# Patient Record
Sex: MALE | Race: BLACK OR AFRICAN AMERICAN | Employment: FULL TIME | URBAN - NONMETROPOLITAN AREA
[De-identification: names, ages, dates, MRNs, and addresses within clinical notes are randomized per-mention and may not be internally consistent; named-entity substitution may affect disease eponyms.]

---

## 2022-05-18 ENCOUNTER — OFFICE VISIT (OUTPATIENT)
Dept: SURGERY | Age: 62
End: 2022-05-18
Payer: COMMERCIAL

## 2022-05-18 VITALS
BODY MASS INDEX: 26.9 KG/M2 | WEIGHT: 171.4 LBS | DIASTOLIC BLOOD PRESSURE: 92 MMHG | SYSTOLIC BLOOD PRESSURE: 142 MMHG | HEIGHT: 67 IN | HEART RATE: 89 BPM

## 2022-05-18 DIAGNOSIS — M79.89 MASS OF SOFT TISSUE OF NECK: Primary | ICD-10-CM

## 2022-05-18 PROCEDURE — 99203 OFFICE O/P NEW LOW 30 MIN: CPT | Performed by: SURGERY

## 2022-05-18 NOTE — PROGRESS NOTES
Louann Rose presents today for   Chief Complaint   Patient presents with    New Patient     cyst to back of neck with some pain due to computer work        Is someone accompanying this pt? Yes, Fadia Islas patients wife     Is the patient using any DME equipment during OV? no    Depression Screening:  3 most recent PHQ Screens 5/18/2022   Little interest or pleasure in doing things Not at all   Feeling down, depressed, irritable, or hopeless Not at all   Total Score PHQ 2 0   Trouble falling or staying asleep, or sleeping too much Not at all   Feeling tired or having little energy Not at all   Poor appetite, weight loss, or overeating Not at all   Feeling bad about yourself - or that you are a failure or have let yourself or your family down Not at all   Trouble concentrating on things such as school, work, reading, or watching TV Not at all   Moving or speaking so slowly that other people could have noticed; or the opposite being so fidgety that others notice Not at all   Thoughts of being better off dead, or hurting yourself in some way Not at all   PHQ 9 Score 0   How difficult have these problems made it for you to do your work, take care of your home and get along with others Not difficult at all         Health Maintenance reviewed and discussed and ordered per Provider. Health Maintenance Due   Topic Date Due    Depression Screen  Never done    COVID-19 Vaccine (1) Never done    DTaP/Tdap/Td series (1 - Tdap) Never done    Lipid Screen  Never done    Colorectal Cancer Screening Combo  Never done    Shingrix Vaccine Age 50> (1 of 2) Never done   . Coordination of Care:  1. Have you been to the ER, urgent care clinic since your last visit? Hospitalized since your last visit? Yes due to ear infection     2. Have you seen or consulted any other health care providers outside of the [address] [place] [address] since your last visit? Include any pap smears or colon screening.  no

## 2022-05-18 NOTE — PROGRESS NOTES
History and Physical    Patient: Shanta Delaney MRN: [id number]  SSN: [id number]    YOB: 1960  Age: 63 y.o. Sex: male      Subjective:      Shanta Delaney is a 63 y.o. male who is being seen for a several year history of a posterior neck mass. He does not think it is increasing in size. He would like to have it removed as it causes some neck discomfort when at work. Denies any drainage from the mass. No similar masses elsewhere. .    No Known Allergies  Current Outpatient Medications   Medication Sig Dispense Refill    lisinopril (PRINIVIL, ZESTRIL) 10 mg tablet Take  by mouth daily.  aspirin (ASPIRIN) 325 mg tablet Take 325 mg by mouth DIALYSIS PRN.  polyethylene glycol (MIRALAX) 17 gram/dose powder Take as directed by office 255 g 0     Past Medical History:   Diagnosis Date    FHx: colon cancer     H/O: HTN (hypertension)     Hypertension     Tobacco use      Past Surgical History:   Procedure Laterality Date    HX HERNIA REPAIR N/A     HX ORTHOPAEDIC      otho knee left arthroscopy    IA ABDOMEN SURGERY PROC UNLISTED        Social History     Tobacco Use    Smoking status: Current Some Day Smoker     Packs/day: 0.25     Years: 40.00     Pack years: 10.00     Types: Cigarettes    Smokeless tobacco: Never Used   Substance Use Topics    Alcohol use: Yes     Alcohol/week: 15.0 standard drinks     Types: 18 Cans of beer per week     Family History   Problem Relation Age of Onset    Colon Cancer Father 70           Review of Systems:    General: Denies fevers, chills, night sweats, fatigue, weight loss, or weight gain.     HEENT: Denies changes in auditory or visual acuity, recurrent pharyngitis, epistaxis, chronic rhinorrhea, vertigo    Respiratory: Denies increasing shortness of breath, productive cough, hemoptysis    Cardiac: Denies known history of cardiac disease, heart murmur, palpitations    GI: Denies dysphagia, recurrent emesis, hematemesis, changes in bowel habits, hematochezia, melena  Has had routine screening colonoscopies as his father  from colorectal CA. : Denies hematuria frequency urgency dysuria    Musculoskeletal: Denies fractures, dislocations    Neurologic: Denies history of CVA, paralysis paresthesias, recurrent cephalgia, seizures    Endocrine: Denies polyuria, polydipsia, polyphagia, heat and cold intolerance    Lymph/heme: Denies a history of malignancy, anemia, bruising, blood transfusions    Integumentary: Negative for dermatitis. See HPI. Objective:     Vitals:    22 0910   BP: (!) 142/92   Pulse: 89   Weight: 77.7 kg (171 lb 6.4 oz)   Height: 5' 7\" (1.702 m)        General: no acute distress, nontoxic in appearance. Head: Normocephalic, atraumatic  Mouth: Clear, no overt lesions, oral mucosa is pink and moist.  Neck: Supple,, no adenopathy or carotid bruits, trachea midline. There is a 2.5 cm firm, rubbery, mobile mass in the posterior midline of the neck. Mass is not tender. Resp: Clear to auscultation bilaterally, no wheezing, rhonchi, or rales, excursions normal and symmetrical.  Cardio: Regular rate and rhythm, no murmurs, clicks, gallops, or rubs. Abdomen: soft, nontender, nondistended, normoactive bowel sounds, no hernias. Well healed right inguinal surgical scar from prior hernia repair. Not tender, no evidence of recurrence,  Extremities: Warm, well perfused, no tenderness or swelling, normal gait/station, without edema or varicosities  Neuro: Sensation and strength grossly intact and symmetrical.  Psych: Alert and oriented to person, place, and time. 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 HTN by history  S/P right inguinal hernia repair X 2. Plan:     Have offered to proceed with outpatient surgical excision under local [facility]. Discussed risks of bleeding, infection, poor wound healing and recurrence as well as possible numbness of the skin.   Patient understands and wishes to proceed with surgery.     Signed By: Susan Cruz MD     May 18, 2022

## 2022-07-19 ENCOUNTER — HOSPITAL ENCOUNTER (OUTPATIENT)
Dept: LAB | Age: 62
Discharge: HOME | End: 2022-07-19
Payer: COMMERCIAL

## 2022-07-19 DIAGNOSIS — M79.89 MASS OF SOFT TISSUE OF NECK: ICD-10-CM

## 2022-07-19 LAB
ALBUMIN SERPL-MCNC: 4 G/DL (ref 3.4–5)
ALBUMIN/GLOB SERPL: 1.1 {RATIO} (ref 0.8–1.7)
ALP SERPL-CCNC: 65 U/L (ref 45–117)
ALT SERPL-CCNC: 29 U/L (ref 16–61)
ANION GAP SERPL CALC-SCNC: 8 MMOL/L (ref 3–18)
AST SERPL W P-5'-P-CCNC: 24 U/L (ref 10–38)
BASOPHILS # BLD: 0.1 K/UL (ref 0–0.1)
BASOPHILS NFR BLD: 1 % (ref 0–2)
BILIRUB SERPL-MCNC: 0.9 MG/DL (ref 0.2–1)
BUN SERPL-MCNC: 18 MG/DL (ref 7–18)
BUN/CREAT SERPL: 18 (ref 12–20)
CA-I BLD-MCNC: 9.7 MG/DL (ref 8.5–10.1)
CHLORIDE SERPL-SCNC: 102 MMOL/L (ref 100–111)
CO2 SERPL-SCNC: 26 MMOL/L (ref 21–32)
CREAT SERPL-MCNC: 1.02 MG/DL (ref 0.6–1.3)
DIFFERENTIAL METHOD BLD: ABNORMAL
EOSINOPHIL # BLD: 0.2 K/UL (ref 0–0.4)
EOSINOPHIL NFR BLD: 3 % (ref 0–5)
ERYTHROCYTE [DISTWIDTH] IN BLOOD BY AUTOMATED COUNT: 12.8 % (ref 11.6–14.5)
GLOBULIN SER CALC-MCNC: 3.8 G/DL (ref 2–4)
GLUCOSE SERPL-MCNC: 105 MG/DL (ref 74–99)
HCT VFR BLD AUTO: 42.9 % (ref 36–48)
HGB BLD-MCNC: 14.4 G/DL (ref 13–16)
IMM GRANULOCYTES # BLD AUTO: 0 K/UL (ref 0–0.04)
IMM GRANULOCYTES NFR BLD AUTO: 0 % (ref 0–0.5)
LYMPHOCYTES # BLD: 1.7 K/UL (ref 0.9–3.6)
LYMPHOCYTES NFR BLD: 25 % (ref 21–52)
MCH RBC QN AUTO: 30.9 PG (ref 24–34)
MCHC RBC AUTO-ENTMCNC: 33.6 G/DL (ref 31–37)
MCV RBC AUTO: 92.1 FL (ref 78–100)
MONOCYTES # BLD: 0.8 K/UL (ref 0.05–1.2)
MONOCYTES NFR BLD: 12 % (ref 3–10)
NEUTS SEG # BLD: 4 K/UL (ref 1.8–8)
NEUTS SEG NFR BLD: 59 % (ref 40–73)
NRBC # BLD: 0 K/UL (ref 0–0.01)
NRBC BLD-RTO: 0 PER 100 WBC
PLATELET # BLD AUTO: 267 K/UL (ref 135–420)
PMV BLD AUTO: 11.6 FL (ref 9.2–11.8)
POTASSIUM SERPL-SCNC: 3 MMOL/L (ref 3.5–5.5)
PROT SERPL-MCNC: 7.8 G/DL (ref 6.4–8.2)
RBC # BLD AUTO: 4.66 M/UL (ref 4.35–5.65)
SODIUM SERPL-SCNC: 136 MMOL/L (ref 136–145)
WBC # BLD AUTO: 6.8 K/UL (ref 4.6–13.2)

## 2022-07-19 PROCEDURE — 80053 COMPREHEN METABOLIC PANEL: CPT

## 2022-07-19 PROCEDURE — 85025 COMPLETE CBC W/AUTO DIFF WBC: CPT

## 2022-07-19 PROCEDURE — 36415 COLL VENOUS BLD VENIPUNCTURE: CPT

## 2022-07-25 ENCOUNTER — OFFICE VISIT (OUTPATIENT)
Dept: SURGERY | Age: 62
End: 2022-07-25
Payer: COMMERCIAL

## 2022-07-25 ENCOUNTER — TELEPHONE (OUTPATIENT)
Dept: SURGERY | Age: 62
End: 2022-07-25

## 2022-07-25 VITALS
WEIGHT: 178.6 LBS | SYSTOLIC BLOOD PRESSURE: 162 MMHG | DIASTOLIC BLOOD PRESSURE: 94 MMHG | BODY MASS INDEX: 28.03 KG/M2 | HEART RATE: 90 BPM | HEIGHT: 67 IN | OXYGEN SATURATION: 98 %

## 2022-07-25 DIAGNOSIS — M79.89 MASS OF SOFT TISSUE OF NECK: Primary | ICD-10-CM

## 2022-07-25 DIAGNOSIS — E87.6 HYPOKALEMIA: ICD-10-CM

## 2022-07-25 PROCEDURE — 99213 OFFICE O/P EST LOW 20 MIN: CPT | Performed by: SURGERY

## 2022-07-25 RX ORDER — ACETAMINOPHEN 500 MG
TABLET ORAL DAILY
COMMUNITY

## 2022-07-25 NOTE — PROGRESS NOTES
History and Physical    Patient: Lisset Lim MRN: [id number]  SSN: [id number]    YOB: 1960  Age: 63 y.o. Sex: male      Subjective:      Lisset Lim is a 63 y.o. male who is being seen for evaluation of posterior neck mass. Has been present for several years, possibly incrreasing in size. He is scheduled for excision of the mass tomorrow under [address] [place] [address]. We haven't seen him for two months so we asked him to come in today for preoperative evaluation. He denies nay changes since his oast visit. No Known Allergies  Current Outpatient Medications   Medication Sig Dispense Refill    cholecalciferol (VITAMIN D3) (2,000 UNITS /50 MCG) cap capsule Take  by mouth daily. lisinopril (PRINIVIL, ZESTRIL) 10 mg tablet Take  by mouth daily. aspirin (ASPIRIN) 325 mg tablet Take 325 mg by mouth DIALYSIS PRN. (Patient not taking: Reported on 7/25/2022)      polyethylene glycol (MIRALAX) 17 gram/dose powder Take as directed by office (Patient not taking: Reported on 7/25/2022) 255 g 0     Past Medical History:   Diagnosis Date    FHx: colon cancer     H/O: HTN (hypertension)     Hypertension     Tobacco use    S/p right inguinal hernia repair X 2. Social History     Tobacco Use    Smoking status: Some Days     Packs/day: 0.25     Years: 40.00     Pack years: 10.00     Types: Cigarettes    Smokeless tobacco: Never   Substance Use Topics    Alcohol use: Yes     Alcohol/week: 15.0 standard drinks     Types: 18 Cans of beer per week     Family History   Problem Relation Age of Onset    Colon Cancer Father 70           Review of Systems:    General: Denies fevers, chills, night sweats, fatigue, weight loss, or weight gain.     HEENT: Denies changes in auditory or visual acuity, recurrent pharyngitis, epistaxis, chronic rhinorrhea, vertigo    Respiratory: Denies increasing shortness of breath, productive cough, hemoptysis    Cardiac: Denies known history of cardiac disease, heart murmur, palpitations    GI: Denies dysphagia, recurrent emesis, hematemesis, changes in bowel habits, hematochezia, melena    : Denies hematuria frequency urgency dysuria    Musculoskeletal: Denies fractures, dislocations    Neurologic: Denies history of CVA, paralysis paresthesias, recurrent cephalgia, seizures    Endocrine: Denies polyuria, polydipsia, polyphagia, heat and cold intolerance    Lymph/heme: Denies a history of malignancy, anemia, bruising, blood transfusions    Integumentary: Negative for dermatitis     Objective:     Vitals:    07/25/22 1129 07/25/22 1136   BP: (!) 199/93 (!) 162/94   Pulse: 90 90   SpO2: 98%    Weight: 81 kg (178 lb 9.6 oz)    Height: 5' 7\" (1.702 m)         General: no acute distress, nontoxic in appearance. Head: Normocephalic, atraumatic  Mouth: Clear, no overt lesions, oral mucosa is pink and moist.  Neck: Supple, no masses, no adenopathy or carotid bruits, trachea midline  Resp: Clear to auscultation bilaterally, no wheezing, rhonchi, or rales, excursions normal and symmetrical.  Cardio: Regular rate and rhythm, no murmurs, clicks, gallops, or rubs. Abdomen: soft, nontender, nondistended, normoactive bowel sounds, no hernias. Extremities: Warm, well perfused, no tenderness or swelling, normal gait/station, without edema or varicosities  Neuro: Sensation and strength grossly intact and symmetrical.  Psych: Alert and oriented to person, place, and time. Simon with a 3 cm rubbery, firm, non tender mass posterior midline. No other neck masses, no adenopathy, trachea is midline, thytrid not enlarge, no JVD. Assessment:   Posterior neck mass, lipoma vs cyst.  B.P up today. Patient takes his lisinopril as directed. States B.P. at home yesterday was 126/62. Hypokalemia, probably secondary to his meds      Plan:     Cancel procedure for tomorrow to allow supplementation of his potassium. Refer back to his PCP at the [address]     Signed By: Marlen Wolf MD     July 25, 2022

## 2022-07-25 NOTE — TELEPHONE ENCOUNTER
Called and left a message for Dr. Xochitl Roman office to inform about patients potassium level. Elisabeth Albarran from [address] [place] [address] returned the call. I informed her about the patients potassium level being at a 3 and that we had to cancel the patients surgery. Elisabeth Albarran stated that she would leave a message for the provider to call the patient.

## 2022-07-25 NOTE — PROGRESS NOTES
Mateo Kidney presents today for pre op appt. Patient states no concerns at this time  Chief Complaint   Patient presents with    Pre-op Exam     Excission of mass       Is someone accompanying this pt? No    Is the patient using any DME equipment during OV? No    Depression Screening:  3 most recent PHQ Screens 7/25/2022   Little interest or pleasure in doing things Not at all   Feeling down, depressed, irritable, or hopeless Not at all   Total Score PHQ 2 0   Trouble falling or staying asleep, or sleeping too much -   Feeling tired or having little energy -   Poor appetite, weight loss, or overeating -   Feeling bad about yourself - or that you are a failure or have let yourself or your family down -   Trouble concentrating on things such as school, work, reading, or watching TV -   Moving or speaking so slowly that other people could have noticed; or the opposite being so fidgety that others notice -   Thoughts of being better off dead, or hurting yourself in some way -   PHQ 9 Score -   How difficult have these problems made it for you to do your work, take care of your home and get along with others -       Learning Assessment:  No flowsheet data found. Fall Risk  No flowsheet data found. ADL  No flowsheet data found. Health Maintenance reviewed and discussed and ordered per Provider. Health Maintenance Due   Topic Date Due    Hepatitis C Screening  Never done    COVID-19 Vaccine (1) Never done    Pneumococcal 0-64 years (1 - PCV) Never done    DTaP/Tdap/Td series (1 - Tdap) Never done    Lipid Screen  Never done    Colorectal Cancer Screening Combo  Never done    Shingrix Vaccine Age 50> (1 of 2) Never done   . Coordination of Care:  1. \"Have you been to the ER, urgent care clinic since your last visit? Hospitalized since your last visit? \" No    2. \"Have you seen or consulted any other health care providers outside of the [address] [place] [address] since your last visit? \" Yes[facility]    3. For patients aged 39-70: Has the patient had a colonoscopy?  No

## 2022-07-25 NOTE — TELEPHONE ENCOUNTER
Per Dr. Rocio Palacios, patient's potassium is 3 and cannot have surgical procedure that is scheduled for tomorrow, July 26, 2022. Dr. Rocio Palacios talked to patient and informed him of this. He informed patient that we would contact his PCP, Dr. Angeline Agudelo to let him know about the potassium level and that the surgery has been cancelled for tomorrow. I spoke with the patient to receive the PCP information and entered the PCP information in the patient's chart. Gave this call to Miley Hale CMA to call the PCP office and provide this information to them. Dr. Scott Si number is [phone].

## 2022-09-27 ENCOUNTER — OFFICE VISIT (OUTPATIENT)
Dept: FAMILY MEDICINE CLINIC | Age: 62
End: 2022-09-27
Payer: COMMERCIAL

## 2022-09-27 ENCOUNTER — HOSPITAL ENCOUNTER (OUTPATIENT)
Dept: LAB | Age: 62
Discharge: HOME | End: 2022-09-27
Payer: COMMERCIAL

## 2022-09-27 VITALS
BODY MASS INDEX: 27.91 KG/M2 | OXYGEN SATURATION: 99 % | WEIGHT: 177.8 LBS | HEIGHT: 67 IN | DIASTOLIC BLOOD PRESSURE: 87 MMHG | RESPIRATION RATE: 18 BRPM | SYSTOLIC BLOOD PRESSURE: 139 MMHG | HEART RATE: 91 BPM

## 2022-09-27 DIAGNOSIS — Z13.1 DIABETES MELLITUS SCREENING: ICD-10-CM

## 2022-09-27 DIAGNOSIS — Z80.0 FAMILY HISTORY OF COLORECTAL CANCER: ICD-10-CM

## 2022-09-27 DIAGNOSIS — E55.9 VITAMIN D DEFICIENCY: ICD-10-CM

## 2022-09-27 DIAGNOSIS — M15.9 GENERALIZED OSTEOARTHRITIS OF MULTIPLE SITES: ICD-10-CM

## 2022-09-27 DIAGNOSIS — I10 ESSENTIAL HYPERTENSION: ICD-10-CM

## 2022-09-27 DIAGNOSIS — Z12.5 SCREENING PSA (PROSTATE SPECIFIC ANTIGEN): ICD-10-CM

## 2022-09-27 DIAGNOSIS — E87.6 HYPOKALEMIA: ICD-10-CM

## 2022-09-27 DIAGNOSIS — E78.2 MIXED HYPERLIPIDEMIA: ICD-10-CM

## 2022-09-27 DIAGNOSIS — R68.89 OTHER GENERAL SYMPTOMS AND SIGNS: ICD-10-CM

## 2022-09-27 DIAGNOSIS — R68.89 OTHER GENERAL SYMPTOMS AND SIGNS: Primary | ICD-10-CM

## 2022-09-27 LAB
ALBUMIN SERPL-MCNC: 3.8 G/DL (ref 3.4–5)
ALBUMIN/GLOB SERPL: 1 {RATIO} (ref 0.8–1.7)
ALP SERPL-CCNC: 76 U/L (ref 45–117)
ALT SERPL-CCNC: 31 U/L (ref 16–61)
ANION GAP SERPL CALC-SCNC: 9 MMOL/L (ref 3–18)
AST SERPL W P-5'-P-CCNC: 21 U/L (ref 10–38)
BASOPHILS # BLD: 0 K/UL (ref 0–0.1)
BASOPHILS NFR BLD: 1 % (ref 0–2)
BILIRUB SERPL-MCNC: 1.1 MG/DL (ref 0.2–1)
BUN SERPL-MCNC: 18 MG/DL (ref 7–18)
BUN/CREAT SERPL: 16 (ref 12–20)
CA-I BLD-MCNC: 9.5 MG/DL (ref 8.5–10.1)
CHLORIDE SERPL-SCNC: 102 MMOL/L (ref 100–111)
CO2 SERPL-SCNC: 27 MMOL/L (ref 21–32)
CREAT SERPL-MCNC: 1.14 MG/DL (ref 0.6–1.3)
DIFFERENTIAL METHOD BLD: ABNORMAL
EOSINOPHIL # BLD: 0.1 K/UL (ref 0–0.4)
EOSINOPHIL NFR BLD: 1 % (ref 0–5)
ERYTHROCYTE [DISTWIDTH] IN BLOOD BY AUTOMATED COUNT: 12.9 % (ref 11.6–14.5)
GLOBULIN SER CALC-MCNC: 3.7 G/DL (ref 2–4)
GLUCOSE SERPL-MCNC: 96 MG/DL (ref 74–99)
HCT VFR BLD AUTO: 42.7 % (ref 36–48)
HGB BLD-MCNC: 14.4 G/DL (ref 13–16)
IMM GRANULOCYTES # BLD AUTO: 0 K/UL (ref 0–0.04)
IMM GRANULOCYTES NFR BLD AUTO: 1 % (ref 0–0.5)
LYMPHOCYTES # BLD: 1 K/UL (ref 0.9–3.6)
LYMPHOCYTES NFR BLD: 12 % (ref 21–52)
MCH RBC QN AUTO: 31.4 PG (ref 24–34)
MCHC RBC AUTO-ENTMCNC: 33.7 G/DL (ref 31–37)
MCV RBC AUTO: 93 FL (ref 78–100)
MONOCYTES # BLD: 0.9 K/UL (ref 0.05–1.2)
MONOCYTES NFR BLD: 10 % (ref 3–10)
NEUTS SEG # BLD: 6.2 K/UL (ref 1.8–8)
NEUTS SEG NFR BLD: 75 % (ref 40–73)
NRBC # BLD: 0 K/UL (ref 0–0.01)
NRBC BLD-RTO: 0 PER 100 WBC
PLATELET # BLD AUTO: 266 K/UL (ref 135–420)
PMV BLD AUTO: 11.4 FL (ref 9.2–11.8)
POTASSIUM SERPL-SCNC: 3.1 MMOL/L (ref 3.5–5.5)
PROT SERPL-MCNC: 7.5 G/DL (ref 6.4–8.2)
RBC # BLD AUTO: 4.59 M/UL (ref 4.35–5.65)
SODIUM SERPL-SCNC: 138 MMOL/L (ref 136–145)
TSH SERPL DL<=0.05 MIU/L-ACNC: 2.28 UIU/ML (ref 0.36–3.74)
WBC # BLD AUTO: 8.2 K/UL (ref 4.6–13.2)

## 2022-09-27 PROCEDURE — 84443 ASSAY THYROID STIM HORMONE: CPT

## 2022-09-27 PROCEDURE — 85025 COMPLETE CBC W/AUTO DIFF WBC: CPT

## 2022-09-27 PROCEDURE — 82607 VITAMIN B-12: CPT

## 2022-09-27 PROCEDURE — 80053 COMPREHEN METABOLIC PANEL: CPT

## 2022-09-27 PROCEDURE — 36415 COLL VENOUS BLD VENIPUNCTURE: CPT

## 2022-09-27 PROCEDURE — 84153 ASSAY OF PSA TOTAL: CPT

## 2022-09-27 PROCEDURE — 99214 OFFICE O/P EST MOD 30 MIN: CPT | Performed by: NURSE PRACTITIONER

## 2022-09-27 PROCEDURE — 83036 HEMOGLOBIN GLYCOSYLATED A1C: CPT

## 2022-09-27 PROCEDURE — 82306 VITAMIN D 25 HYDROXY: CPT

## 2022-09-27 RX ORDER — ATORVASTATIN CALCIUM 40 MG/1
TABLET, FILM COATED ORAL DAILY
COMMUNITY

## 2022-09-27 RX ORDER — POTASSIUM CHLORIDE 20 MEQ/1
20 TABLET, EXTENDED RELEASE ORAL 2 TIMES DAILY
Qty: 6 TABLET | Refills: 0 | Status: SHIPPED | OUTPATIENT
Start: 2022-09-27 | End: 2022-10-10 | Stop reason: SDUPTHER

## 2022-09-27 RX ORDER — FERROUS SULFATE, DRIED 160(50) MG
1 TABLET, EXTENDED RELEASE ORAL 2 TIMES DAILY WITH MEALS
COMMUNITY

## 2022-09-27 RX ORDER — MELOXICAM 15 MG/1
15 TABLET ORAL DAILY
COMMUNITY

## 2022-09-27 RX ORDER — AMLODIPINE BESYLATE AND ATORVASTATIN CALCIUM 10; 10 MG/1; MG/1
1 TABLET, FILM COATED ORAL DAILY
COMMUNITY

## 2022-09-27 NOTE — PROGRESS NOTES
Zeinab Shepherd presents today for   Chief Complaint   Patient presents with    [facility] Care     Doctor that was suppose to perform surgery on his neck stated his potassium level was low. Is someone accompanying this pt? Yes, wife    Is the patient using any DME equipment during [address]? No    Depression Screening:  3 most recent PHQ Screens 9/27/2022   Little interest or pleasure in doing things Not at all   Feeling down, depressed, irritable, or hopeless Not at all   Total Score PHQ 2 0   Trouble falling or staying asleep, or sleeping too much -   Feeling tired or having little energy -   Poor appetite, weight loss, or overeating -   Feeling bad about yourself - or that you are a failure or have let yourself or your family down -   Trouble concentrating on things such as school, work, reading, or watching TV -   Moving or speaking so slowly that other people could have noticed; or the opposite being so fidgety that others notice -   Thoughts of being better off dead, or hurting yourself in some way -   PHQ 9 Score -   How difficult have these problems made it for you to do your work, take care of your home and get along with others -       Learning Assessment:  No flowsheet data found. Fall Risk  No flowsheet data found.     ADL  ADL Assessment 9/27/2022   Feeding yourself No Help Needed   Getting from bed to chair No Help Needed   Getting dressed No Help Needed   Bathing or showering No Help Needed   Walk across the room (includes cane/walker) No Help Needed   Using the telphone No Help Needed   Taking your medications No Help Needed   Preparing meals No Help Needed   Managing money (expenses/bills) No Help Needed   Moderately strenuous housework (laundry) No Help Needed   Shopping for personal items (toiletries/medicines) No Help Needed   Shopping for groceries No Help Needed   Driving No Help Needed   Climbing a flight of stairs No Help Needed   Getting to places beyond walking distances No Help Needed Health Maintenance reviewed and discussed and ordered per Provider. Health Maintenance Due   Topic Date Due    Hepatitis C Screening  Never done    Pneumococcal 0-64 years (1 - PCV) Never done    Lipid Screen  Never done    DTaP/Tdap/Td series (1 - Tdap) Never done    Colorectal Cancer Screening Combo  Never done    Shingrix Vaccine Age 50> (1 of 2) Never done    COVID-19 Vaccine (2 - Booster for [employer] series) 08/04/2021    Flu Vaccine (1) Never done   . Coordination of Care:  1. \"Have you been to the ER, urgent care clinic since your last visit? Hospitalized since your last visit? \" No    2. \"Have you seen or consulted any other health care providers outside of the [address] [place] [address] since your last visit? \" No     3. For patients aged 39-70: Has the patient had a colonoscopy? Yes - no Care Gap present     If the patient is female:    4. For patients aged 41-77: Has the patient had a mammogram within the past 2 years? No    5. For patients aged 21-65: Has the patient had a pap smear?  No independent

## 2022-09-27 NOTE — PROGRESS NOTES
History of Present Illness  Zeinab Shepherd is a 63 y.o. male who presents today for:    Chief Complaint   Patient presents with    [facility]     Doctor that was suppose to perform surgery on his neck stated his potassium level was low. Past Medical History  Past Medical History:   Diagnosis Date    FHx: colon cancer     H/O: HTN (hypertension)     Hypertension     Tobacco use         Surgical History  Past Surgical History:   Procedure Laterality Date    HX HERNIA REPAIR N/A     HX ORTHOPAEDIC      otho knee left arthroscopy    NE ABDOMEN SURGERY PROC UNLISTED          Current Medications  Current Outpatient Medications   Medication Sig    amLODIPine-atorvastatin (CADUET) 10-10 mg per tablet Take 1 Tablet by mouth daily. atorvastatin (LIPITOR) 40 mg tablet Take  by mouth daily. calcium-vitamin D (OS-SHANE +D3) 500 mg-200 unit per tablet Take 1 Tablet by mouth two (2) times daily (with meals). meloxicam (MOBIC) 15 mg tablet Take 15 mg by mouth daily. cholecalciferol (VITAMIN D3) (2,000 UNITS /50 MCG) cap capsule Take  by mouth daily. lisinopril (PRINIVIL, ZESTRIL) 10 mg tablet Take 20 mg by mouth daily. aspirin (ASPIRIN) 325 mg tablet Take 325 mg by mouth DIALYSIS PRN. (Patient not taking: Reported on 7/25/2022)    polyethylene glycol (MIRALAX) 17 gram/dose powder Take as directed by office (Patient not taking: Reported on 7/25/2022)     No current facility-administered medications for this visit. Allergies/Drug Reactions  No Known Allergies     Family History  Family History   Problem Relation Age of Onset    Colon Cancer Father 70        Social History  Social History     Tobacco Use    Smoking status: Some Days     Packs/day: 0.25     Years: 40.00     Pack years: 10.00     Types: Cigarettes    Smokeless tobacco: Never   Vaping Use    Vaping Use: Never used   Substance Use Topics    Alcohol use:  Yes     Alcohol/week: 15.0 standard drinks     Types: 18 Cans of beer per week    Drug use: No        Health Maintenance   Topic Date Due    Hepatitis C Screening  Never done    Pneumococcal 0-64 years (1 - PCV) Never done    Lipid Screen  Never done    DTaP/Tdap/Td series (1 - Tdap) Never done    Colorectal Cancer Screening Combo  Never done    Shingrix Vaccine Age 50> (1 of 2) Never done    COVID-19 Vaccine (2 - Booster for [employer] Excelerator series) 08/04/2021    Flu Vaccine (1) Never done    Depression Screen  09/27/2023     Immunization History   Administered Date(s) Administered    COVID-19, J&J, (age 18y+), IM, 0.5mL 06/09/2021     Physical Exam  Vital signs:   Vitals:    09/27/22 1541 09/27/22 1543   BP: (!) 142/85 139/87   Pulse: 99 91   Resp: 18 18   SpO2: 99% 99%   Weight: 177 lb 12.8 oz (80.6 kg)    Height: 5' 7\" (1.702 m)      Laboratory/Tests:  Hospital Outpatient Visit on 09/27/2022   Component Date Value Ref Range Status    WBC 09/27/2022 8.2  4.6 - 13.2 K/uL Final    RBC 09/27/2022 4.59  4.35 - 5.65 M/uL Final    HGB 09/27/2022 14.4  13.0 - 16.0 g/dL Final    HCT 09/27/2022 42.7  36.0 - 48.0 % Final    MCV 09/27/2022 93.0  78.0 - 100.0 FL Final    MCH 09/27/2022 31.4  24.0 - 34.0 PG Final    MCHC 09/27/2022 33.7  31.0 - 37.0 g/dL Final    RDW 09/27/2022 12.9  11.6 - 14.5 % Final    PLATELET 55/22/3875 321  135 - 420 K/uL Final    MPV 09/27/2022 11.4  9.2 - 11.8 FL Final    NRBC 09/27/2022 0.0  0.0  WBC Final    ABSOLUTE NRBC 09/27/2022 0.00  0.00 - 0.01 K/uL Final    NEUTROPHILS 09/27/2022 75 (A)  40 - 73 % Final    LYMPHOCYTES 09/27/2022 12 (A)  21 - 52 % Final    MONOCYTES 09/27/2022 10  3 - 10 % Final    EOSINOPHILS 09/27/2022 1  0 - 5 % Final    BASOPHILS 09/27/2022 1  0 - 2 % Final    IMMATURE GRANULOCYTES 09/27/2022 1 (A)  0 - 0.5 % Final    ABS. NEUTROPHILS 09/27/2022 6.2  1.8 - 8.0 K/UL Final    ABS. LYMPHOCYTES 09/27/2022 1.0  0.9 - 3.6 K/UL Final    ABS. MONOCYTES 09/27/2022 0.9  0.05 - 1.2 K/UL Final    ABS. EOSINOPHILS 09/27/2022 0.1  0.0 - 0.4 K/UL Final    ABS.  BASOPHILS 09/27/2022 0.0  0.0 - 0.1 K/UL Final    ABS. IMM. GRANS. 09/27/2022 0.0  0.00 - 0.04 K/UL Final    DF 09/27/2022 AUTOMATED    Final    Sodium 09/27/2022 138  136 - 145 mmol/L Final    Potassium 09/27/2022 3.1 (A)  3.5 - 5.5 mmol/L Final    Chloride 09/27/2022 102  100 - 111 mmol/L Final    CO2 09/27/2022 27  21 - 32 mmol/L Final    Anion gap 09/27/2022 9  3.0 - 18.0 mmol/L Final    Glucose 09/27/2022 96  74 - 99 mg/dL Final    BUN 09/27/2022 18  7 - 18 mg/dL Final    Creatinine 09/27/2022 1.14  0.60 - 1.30 mg/dL Final    BUN/Creatinine ratio 09/27/2022 16  12 - 20   Final    GFR est AA 09/27/2022 >60  >60 ml/min/1.73m2 Final    GFR est non-AA 09/27/2022 >60  >60 ml/min/1.73m2 Final    Comment: Estimated GFR is calculated using the IDMS-traceable Modification of Diet in Renal Disease (MDRD) Study equation, reported for both  Americans (GFRAA) and non- Americans (GFRNA), and normalized to 1.73m2 body surface area. The physician must decide which value applies to the patient. The MDRD study equation should only be used in individuals age 25 or older. It has not been validated for the following: pregnant women, patients with serious comorbid conditions, or on certain medications, or persons with extremes of body size, muscle mass, or nutritional status. Calcium 09/27/2022 9.5  8.5 - 10.1 mg/dL Final    Bilirubin, total 09/27/2022 1.1 (A)  0.2 - 1.0 mg/dL Final    AST (SGOT) 09/27/2022 21  10 - 38 U/L Final    ALT (SGPT) 09/27/2022 31  16 - 61 U/L Final    Alk.  phosphatase 09/27/2022 76  45 - 117 U/L Final    Protein, total 09/27/2022 7.5  6.4 - 8.2 g/dL Final    Albumin 09/27/2022 3.8  3.4 - 5.0 g/dL Final    Globulin 09/27/2022 3.7  2.0 - 4.0 g/dL Final    A-G Ratio 09/27/2022 1.0  0.8 - 1.7   Final    TSH 09/27/2022 2.28  0.36 - 3.74 uIU/mL Final    Comment:    Due to TSH heterogeneity, both structurally and degree of glycosylation, monoclonal antibodies used in the TSH assay may not accurately quantitate TSH. Therefore, this result should be correlated with clinical findings as well as with other assessments of thyroid function, e.g., free T4, free T3. Hospital Outpatient Visit on 07/19/2022   Component Date Value Ref Range Status    WBC 07/19/2022 6.8  4.6 - 13.2 K/uL Final    RBC 07/19/2022 4.66  4.35 - 5.65 M/uL Final    HGB 07/19/2022 14.4  13.0 - 16.0 g/dL Final    HCT 07/19/2022 42.9  36.0 - 48.0 % Final    MCV 07/19/2022 92.1  78.0 - 100.0 FL Final    MCH 07/19/2022 30.9  24.0 - 34.0 PG Final    MCHC 07/19/2022 33.6  31.0 - 37.0 g/dL Final    RDW 07/19/2022 12.8  11.6 - 14.5 % Final    PLATELET 19/52/7666 303  135 - 420 K/uL Final    MPV 07/19/2022 11.6  9.2 - 11.8 FL Final    NRBC 07/19/2022 0.0  0.0  WBC Final    ABSOLUTE NRBC 07/19/2022 0.00  0.00 - 0.01 K/uL Final    NEUTROPHILS 07/19/2022 59  40 - 73 % Final    LYMPHOCYTES 07/19/2022 25  21 - 52 % Final    MONOCYTES 07/19/2022 12 (A)  3 - 10 % Final    EOSINOPHILS 07/19/2022 3  0 - 5 % Final    BASOPHILS 07/19/2022 1  0 - 2 % Final    IMMATURE GRANULOCYTES 07/19/2022 0  0 - 0.5 % Final    ABS. NEUTROPHILS 07/19/2022 4.0  1.8 - 8.0 K/UL Final    ABS. LYMPHOCYTES 07/19/2022 1.7  0.9 - 3.6 K/UL Final    ABS. MONOCYTES 07/19/2022 0.8  0.05 - 1.2 K/UL Final    ABS. EOSINOPHILS 07/19/2022 0.2  0.0 - 0.4 K/UL Final    ABS. BASOPHILS 07/19/2022 0.1  0.0 - 0.1 K/UL Final    ABS. IMM.  GRANS. 07/19/2022 0.0  0.00 - 0.04 K/UL Final    DF 07/19/2022 AUTOMATED    Final    Sodium 07/19/2022 136  136 - 145 mmol/L Final    Potassium 07/19/2022 3.0 (A)  3.5 - 5.5 mmol/L Final    Chloride 07/19/2022 102  100 - 111 mmol/L Final    CO2 07/19/2022 26  21 - 32 mmol/L Final    Anion gap 07/19/2022 8  3.0 - 18.0 mmol/L Final    Glucose 07/19/2022 105 (A)  74 - 99 mg/dL Final    BUN 07/19/2022 18  7 - 18 mg/dL Final    Creatinine 07/19/2022 1.02  0.60 - 1.30 mg/dL Final    BUN/Creatinine ratio 07/19/2022 18  12 - 20   Final    GFR est AA 07/19/2022 >60  >60 ml/min/1.73m2 Final    GFR est non-AA 07/19/2022 >60  >60 ml/min/1.73m2 Final    Comment: Estimated GFR is calculated using the IDMS-traceable Modification of Diet in Renal Disease (MDRD) Study equation, reported for both  Americans (GFRAA) and non- Americans (GFRNA), and normalized to 1.73m2 body surface area. The physician must decide which value applies to the patient. The MDRD study equation should only be used in individuals age 25 or older. It has not been validated for the following: pregnant women, patients with serious comorbid conditions, or on certain medications, or persons with extremes of body size, muscle mass, or nutritional status. Calcium 07/19/2022 9.7  8.5 - 10.1 mg/dL Final    Bilirubin, total 07/19/2022 0.9  0.2 - 1.0 mg/dL Final    AST (SGOT) 07/19/2022 24  10 - 38 U/L Final    ALT (SGPT) 07/19/2022 29  16 - 61 U/L Final    Alk. phosphatase 07/19/2022 65  45 - 117 U/L Final    Protein, total 07/19/2022 7.8  6.4 - 8.2 g/dL Final    Albumin 07/19/2022 4.0  3.4 - 5.0 g/dL Final    Globulin 07/19/2022 3.8  2.0 - 4.0 g/dL Final    A-G Ratio 07/19/2022 1.1  0.8 - 1.7   Final     Patient reports right shoulder pain with 35-year history. Patient reports his right shoulder pain and injury occurrred during [facility] service in 1980s. Patient is followed by [facility] and will have follow up soon. Patient is prescribed Meloxicam 15 mg tablet daily. Potassium = 3.0 on 7/19/2022. Which was observed during labs ordered by general surgeon. Patient was evaluated by general surgeon due to posterior neck mass which he reports has been present for several years. Was advised to establish care with local primary provider to address hypokalemia. Patient reports no previous history of hypokalemia before this was discovered on 7/19/2022. Will order repeat CMP at this time. Potassium = 3.1 on 9/27/2022 and will prescribe Potassium 20 M EQ at this time.     Patient reports first-degree relative history of colon cancer. He reports his last colonoscopy was approximately 2 to 3 years ago and he was advised to follow-up within 5 years. Assessment/Plan:    Hypokalemia. Ordered repeat CMP for evaluation of hypokalemia. Potassium = 3.1 on 9/27/2022 and prescribed Potassium 20 M EQ twice daily for 3 days. We will repeat CMP in 1 week. Essential hypertension. Continue Amlodipine/Atorvastatin (Caduet) 10/10 mg tablet daily and Lisinopril 10 mg tablet, take 2 tablets daily for management of essential hypertension. Mixed hyperlipidemia. Continue Atorvastatin 40 mg tablet daily and Amlodipine/Atorvastatin (Caduet) 10/10 mg tablet daily for management of mixed hyperlipidemia. Osteoarthritis of multiple sites. Continue Meloxicam 15 mg tablet daily for management of osteoarthritis of multiple sites. I have discussed the diagnosis with the patient and the intended plan as seen in the above orders. The patient has received an after-visit summary and questions were answered concerning future plans. I have discussed medication side effects and warnings with the patient as well. I have reviewed the plan of care with the patient, accepted their input and they are in agreement with the treatment goals.        Florentin Asif NP  September 27, 2022

## 2022-09-28 DIAGNOSIS — E87.6 HYPOKALEMIA: ICD-10-CM

## 2022-09-28 LAB
25(OH)D3 SERPL-MCNC: 65.5 NG/ML (ref 30–100)
EST. AVERAGE GLUCOSE BLD GHB EST-MCNC: 117 MG/DL
FOLATE SERPL-MCNC: 9.9 NG/ML (ref 3.1–17.5)
HBA1C MFR BLD: 5.7 % (ref 4.2–5.6)
PSA SERPL-MCNC: 0.9 NG/ML (ref 0–4)
VIT B12 SERPL-MCNC: 623 PG/ML (ref 211–911)

## 2022-10-10 ENCOUNTER — HOSPITAL ENCOUNTER (OUTPATIENT)
Dept: LAB | Age: 62
Discharge: HOME | End: 2022-10-10
Payer: COMMERCIAL

## 2022-10-10 DIAGNOSIS — E87.6 HYPOKALEMIA: ICD-10-CM

## 2022-10-10 LAB — POTASSIUM SERPL-SCNC: 3.3 MMOL/L (ref 3.5–5.5)

## 2022-10-10 PROCEDURE — 36415 COLL VENOUS BLD VENIPUNCTURE: CPT

## 2022-10-10 PROCEDURE — 84132 ASSAY OF SERUM POTASSIUM: CPT

## 2022-10-10 RX ORDER — POTASSIUM CHLORIDE 20 MEQ/1
20 TABLET, EXTENDED RELEASE ORAL DAILY
Qty: 60 TABLET | Refills: 0 | Status: SHIPPED | OUTPATIENT
Start: 2022-10-10 | End: 2022-10-10

## 2024-01-17 ENCOUNTER — APPOINTMENT (OUTPATIENT)
Age: 64
End: 2024-01-17
Payer: OTHER GOVERNMENT

## 2024-01-17 ENCOUNTER — HOSPITAL ENCOUNTER (INPATIENT)
Age: 64
LOS: 3 days | Discharge: ANOTHER HOSPITAL | End: 2024-01-20
Attending: EMERGENCY MEDICINE | Admitting: INTERNAL MEDICINE
Payer: OTHER GOVERNMENT

## 2024-01-17 DIAGNOSIS — K57.32 DIVERTICULITIS OF COLON: Primary | ICD-10-CM

## 2024-01-17 DIAGNOSIS — K57.20 COLONIC DIVERTICULAR ABSCESS: ICD-10-CM

## 2024-01-17 DIAGNOSIS — I10 ESSENTIAL HYPERTENSION: ICD-10-CM

## 2024-01-17 PROBLEM — K63.0 INTESTINAL DIVERTICULAR ABSCESS: Status: ACTIVE | Noted: 2024-01-17

## 2024-01-17 LAB
ALBUMIN SERPL-MCNC: 3.1 G/DL (ref 3.4–5)
ALBUMIN/GLOB SERPL: 0.7 (ref 0.8–1.7)
ALP SERPL-CCNC: 95 U/L (ref 45–117)
ALT SERPL-CCNC: 14 U/L (ref 16–61)
ANION GAP SERPL CALC-SCNC: 8 MMOL/L (ref 3–18)
APPEARANCE UR: CLEAR
AST SERPL W P-5'-P-CCNC: 14 U/L (ref 10–38)
BACTERIA URNS QL MICRO: ABNORMAL /HPF
BASOPHILS # BLD: 0.1 K/UL (ref 0–0.1)
BASOPHILS NFR BLD: 1 % (ref 0–2)
BILIRUB DIRECT SERPL-MCNC: 0.2 MG/DL (ref 0–0.2)
BILIRUB SERPL-MCNC: 0.7 MG/DL (ref 0.2–1)
BILIRUB UR QL: NEGATIVE
BNP SERPL-MCNC: 65 PG/ML (ref 0–900)
BUN SERPL-MCNC: 13 MG/DL (ref 7–18)
BUN/CREAT SERPL: 9 (ref 12–20)
CA-I BLD-MCNC: 9.6 MG/DL (ref 8.5–10.1)
CHLORIDE SERPL-SCNC: 103 MMOL/L (ref 100–111)
CO2 SERPL-SCNC: 25 MMOL/L (ref 21–32)
COLOR UR: ABNORMAL
CREAT SERPL-MCNC: 1.38 MG/DL (ref 0.6–1.3)
D DIMER PPP FEU-MCNC: 3.2 UG/ML(FEU)
DIFFERENTIAL METHOD BLD: ABNORMAL
EKG ATRIAL RATE: 119 BPM
EKG DIAGNOSIS: NORMAL
EKG P AXIS: 69 DEGREES
EKG P-R INTERVAL: 136 MS
EKG Q-T INTERVAL: 320 MS
EKG QRS DURATION: 82 MS
EKG QTC CALCULATION (BAZETT): 450 MS
EKG R AXIS: 39 DEGREES
EKG T AXIS: 45 DEGREES
EKG VENTRICULAR RATE: 119 BPM
EOSINOPHIL # BLD: 0 K/UL (ref 0–0.4)
EOSINOPHIL NFR BLD: 0 % (ref 0–5)
EPITH CASTS URNS QL MICRO: ABNORMAL /LPF (ref 0–20)
ERYTHROCYTE [DISTWIDTH] IN BLOOD BY AUTOMATED COUNT: 14.5 % (ref 11.6–14.5)
GLOBULIN SER CALC-MCNC: 4.5 G/DL (ref 2–4)
GLUCOSE BLD STRIP.AUTO-MCNC: 77 MG/DL (ref 70–110)
GLUCOSE BLD STRIP.AUTO-MCNC: 90 MG/DL (ref 70–110)
GLUCOSE SERPL-MCNC: 120 MG/DL (ref 74–99)
GLUCOSE UR STRIP.AUTO-MCNC: NEGATIVE MG/DL
HCT VFR BLD AUTO: 32.9 % (ref 36–48)
HGB BLD-MCNC: 10.4 G/DL (ref 13–16)
HGB UR QL STRIP: NEGATIVE
IMM GRANULOCYTES # BLD AUTO: 0.1 K/UL (ref 0–0.04)
IMM GRANULOCYTES NFR BLD AUTO: 1 % (ref 0–0.5)
KETONES UR QL STRIP.AUTO: ABNORMAL MG/DL
LEUKOCYTE ESTERASE UR QL STRIP.AUTO: ABNORMAL
LIPASE SERPL-CCNC: 39 U/L (ref 13–75)
LYMPHOCYTES # BLD: 0.9 K/UL (ref 0.9–3.6)
LYMPHOCYTES NFR BLD: 7 % (ref 21–52)
MCH RBC QN AUTO: 27.4 PG (ref 24–34)
MCHC RBC AUTO-ENTMCNC: 31.6 G/DL (ref 31–37)
MCV RBC AUTO: 86.6 FL (ref 78–100)
MONOCYTES # BLD: 1.3 K/UL (ref 0.05–1.2)
MONOCYTES NFR BLD: 10 % (ref 3–10)
MUCOUS THREADS URNS QL MICRO: ABNORMAL /LPF
NEUTS SEG # BLD: 10.9 K/UL (ref 1.8–8)
NEUTS SEG NFR BLD: 81 % (ref 40–73)
NITRITE UR QL STRIP.AUTO: NEGATIVE
NRBC # BLD: 0 K/UL (ref 0–0.01)
NRBC BLD-RTO: 0 PER 100 WBC
PERFORMED BY:: NORMAL
PERFORMED BY:: NORMAL
PH UR STRIP: 8.5 (ref 5–8)
PLATELET # BLD AUTO: 436 K/UL (ref 135–420)
PMV BLD AUTO: 10 FL (ref 9.2–11.8)
POTASSIUM SERPL-SCNC: 4.2 MMOL/L (ref 3.5–5.5)
PROT SERPL-MCNC: 7.6 G/DL (ref 6.4–8.2)
PROT UR STRIP-MCNC: 100 MG/DL
RBC # BLD AUTO: 3.8 M/UL (ref 4.35–5.65)
RBC #/AREA URNS HPF: ABNORMAL /HPF (ref 0–2)
SODIUM SERPL-SCNC: 136 MMOL/L (ref 136–145)
SP GR UR REFRACTOMETRY: 1.03 (ref 1–1.03)
TROPONIN I SERPL HS-MCNC: 5 NG/L (ref 0–78)
UROBILINOGEN UR QL STRIP.AUTO: 0.2 EU/DL (ref 0.2–1)
WBC # BLD AUTO: 13.3 K/UL (ref 4.6–13.2)
WBC URNS QL MICRO: ABNORMAL /HPF (ref 0–4)

## 2024-01-17 PROCEDURE — 6360000004 HC RX CONTRAST MEDICATION: Performed by: EMERGENCY MEDICINE

## 2024-01-17 PROCEDURE — 99285 EMERGENCY DEPT VISIT HI MDM: CPT

## 2024-01-17 PROCEDURE — 6360000002 HC RX W HCPCS: Performed by: NURSE PRACTITIONER

## 2024-01-17 PROCEDURE — 83880 ASSAY OF NATRIURETIC PEPTIDE: CPT

## 2024-01-17 PROCEDURE — 84484 ASSAY OF TROPONIN QUANT: CPT

## 2024-01-17 PROCEDURE — 85379 FIBRIN DEGRADATION QUANT: CPT

## 2024-01-17 PROCEDURE — C9113 INJ PANTOPRAZOLE SODIUM, VIA: HCPCS | Performed by: NURSE PRACTITIONER

## 2024-01-17 PROCEDURE — 96366 THER/PROPH/DIAG IV INF ADDON: CPT

## 2024-01-17 PROCEDURE — 93005 ELECTROCARDIOGRAM TRACING: CPT | Performed by: EMERGENCY MEDICINE

## 2024-01-17 PROCEDURE — 71275 CT ANGIOGRAPHY CHEST: CPT

## 2024-01-17 PROCEDURE — 6360000002 HC RX W HCPCS: Performed by: FAMILY MEDICINE

## 2024-01-17 PROCEDURE — 85025 COMPLETE CBC W/AUTO DIFF WBC: CPT

## 2024-01-17 PROCEDURE — 71045 X-RAY EXAM CHEST 1 VIEW: CPT

## 2024-01-17 PROCEDURE — 87040 BLOOD CULTURE FOR BACTERIA: CPT

## 2024-01-17 PROCEDURE — 74177 CT ABD & PELVIS W/CONTRAST: CPT

## 2024-01-17 PROCEDURE — 96365 THER/PROPH/DIAG IV INF INIT: CPT

## 2024-01-17 PROCEDURE — 80048 BASIC METABOLIC PNL TOTAL CA: CPT

## 2024-01-17 PROCEDURE — 1100000000 HC RM PRIVATE

## 2024-01-17 PROCEDURE — 96375 TX/PRO/DX INJ NEW DRUG ADDON: CPT

## 2024-01-17 PROCEDURE — 2580000003 HC RX 258: Performed by: EMERGENCY MEDICINE

## 2024-01-17 PROCEDURE — 2580000003 HC RX 258: Performed by: NURSE PRACTITIONER

## 2024-01-17 PROCEDURE — 81001 URINALYSIS AUTO W/SCOPE: CPT

## 2024-01-17 PROCEDURE — 80076 HEPATIC FUNCTION PANEL: CPT

## 2024-01-17 PROCEDURE — 82962 GLUCOSE BLOOD TEST: CPT

## 2024-01-17 PROCEDURE — 6370000000 HC RX 637 (ALT 250 FOR IP): Performed by: NURSE PRACTITIONER

## 2024-01-17 PROCEDURE — 6360000002 HC RX W HCPCS: Performed by: EMERGENCY MEDICINE

## 2024-01-17 PROCEDURE — 83690 ASSAY OF LIPASE: CPT

## 2024-01-17 RX ORDER — MORPHINE SULFATE 2 MG/ML
1 INJECTION, SOLUTION INTRAMUSCULAR; INTRAVENOUS EVERY 4 HOURS PRN
Status: DISCONTINUED | OUTPATIENT
Start: 2024-01-17 | End: 2024-01-20 | Stop reason: HOSPADM

## 2024-01-17 RX ORDER — 0.9 % SODIUM CHLORIDE 0.9 %
1000 INTRAVENOUS SOLUTION INTRAVENOUS ONCE
Status: COMPLETED | OUTPATIENT
Start: 2024-01-17 | End: 2024-01-17

## 2024-01-17 RX ORDER — OMEPRAZOLE 20 MG/1
20 CAPSULE, DELAYED RELEASE ORAL DAILY
COMMUNITY

## 2024-01-17 RX ORDER — POLYETHYLENE GLYCOL 3350 17 G/17G
17 POWDER, FOR SOLUTION ORAL DAILY PRN
Status: DISCONTINUED | OUTPATIENT
Start: 2024-01-17 | End: 2024-01-20 | Stop reason: HOSPADM

## 2024-01-17 RX ORDER — ENOXAPARIN SODIUM 100 MG/ML
40 INJECTION SUBCUTANEOUS DAILY
Status: DISCONTINUED | OUTPATIENT
Start: 2024-01-17 | End: 2024-01-20 | Stop reason: HOSPADM

## 2024-01-17 RX ORDER — MORPHINE SULFATE 2 MG/ML
2 INJECTION, SOLUTION INTRAMUSCULAR; INTRAVENOUS
Status: COMPLETED | OUTPATIENT
Start: 2024-01-17 | End: 2024-01-17

## 2024-01-17 RX ORDER — AMLODIPINE BESYLATE 5 MG/1
10 TABLET ORAL DAILY
Status: DISCONTINUED | OUTPATIENT
Start: 2024-01-17 | End: 2024-01-20 | Stop reason: HOSPADM

## 2024-01-17 RX ORDER — ACETAMINOPHEN 325 MG/1
650 TABLET ORAL EVERY 6 HOURS PRN
Status: DISCONTINUED | OUTPATIENT
Start: 2024-01-17 | End: 2024-01-20 | Stop reason: HOSPADM

## 2024-01-17 RX ORDER — ASPIRIN 325 MG
325 TABLET, DELAYED RELEASE (ENTERIC COATED) ORAL DAILY
COMMUNITY

## 2024-01-17 RX ORDER — PANTOPRAZOLE SODIUM 40 MG/10ML
40 INJECTION, POWDER, LYOPHILIZED, FOR SOLUTION INTRAVENOUS DAILY
Status: DISCONTINUED | OUTPATIENT
Start: 2024-01-17 | End: 2024-01-20 | Stop reason: HOSPADM

## 2024-01-17 RX ORDER — SODIUM CHLORIDE 9 MG/ML
INJECTION, SOLUTION INTRAVENOUS CONTINUOUS
Status: DISCONTINUED | OUTPATIENT
Start: 2024-01-17 | End: 2024-01-17

## 2024-01-17 RX ORDER — ONDANSETRON 4 MG/1
4 TABLET, ORALLY DISINTEGRATING ORAL EVERY 8 HOURS PRN
Status: DISCONTINUED | OUTPATIENT
Start: 2024-01-17 | End: 2024-01-20 | Stop reason: HOSPADM

## 2024-01-17 RX ORDER — ONDANSETRON 2 MG/ML
4 INJECTION INTRAMUSCULAR; INTRAVENOUS EVERY 6 HOURS PRN
Status: DISCONTINUED | OUTPATIENT
Start: 2024-01-17 | End: 2024-01-20 | Stop reason: HOSPADM

## 2024-01-17 RX ORDER — HYDRALAZINE HYDROCHLORIDE 20 MG/ML
10 INJECTION INTRAMUSCULAR; INTRAVENOUS EVERY 6 HOURS PRN
Status: DISCONTINUED | OUTPATIENT
Start: 2024-01-17 | End: 2024-01-20 | Stop reason: HOSPADM

## 2024-01-17 RX ORDER — SODIUM CHLORIDE 0.9 % (FLUSH) 0.9 %
5-40 SYRINGE (ML) INJECTION PRN
Status: DISCONTINUED | OUTPATIENT
Start: 2024-01-17 | End: 2024-01-20 | Stop reason: HOSPADM

## 2024-01-17 RX ORDER — LOSARTAN POTASSIUM 50 MG/1
50 TABLET ORAL DAILY
COMMUNITY

## 2024-01-17 RX ORDER — ONDANSETRON 2 MG/ML
4 INJECTION INTRAMUSCULAR; INTRAVENOUS
Status: COMPLETED | OUTPATIENT
Start: 2024-01-17 | End: 2024-01-17

## 2024-01-17 RX ORDER — DEXTROSE MONOHYDRATE 100 MG/ML
INJECTION, SOLUTION INTRAVENOUS CONTINUOUS PRN
Status: DISCONTINUED | OUTPATIENT
Start: 2024-01-17 | End: 2024-01-20 | Stop reason: HOSPADM

## 2024-01-17 RX ORDER — SODIUM CHLORIDE 0.9 % (FLUSH) 0.9 %
5-40 SYRINGE (ML) INJECTION EVERY 12 HOURS SCHEDULED
Status: DISCONTINUED | OUTPATIENT
Start: 2024-01-17 | End: 2024-01-20 | Stop reason: HOSPADM

## 2024-01-17 RX ORDER — DEXTROSE AND SODIUM CHLORIDE 5; .9 G/100ML; G/100ML
INJECTION, SOLUTION INTRAVENOUS CONTINUOUS
Status: DISCONTINUED | OUTPATIENT
Start: 2024-01-17 | End: 2024-01-20 | Stop reason: HOSPADM

## 2024-01-17 RX ORDER — AMLODIPINE BESYLATE 10 MG/1
10 TABLET ORAL DAILY
COMMUNITY

## 2024-01-17 RX ORDER — SPIRONOLACTONE 25 MG/1
12.5 TABLET ORAL DAILY
COMMUNITY

## 2024-01-17 RX ORDER — ACETAMINOPHEN 650 MG/1
650 SUPPOSITORY RECTAL EVERY 6 HOURS PRN
Status: DISCONTINUED | OUTPATIENT
Start: 2024-01-17 | End: 2024-01-20 | Stop reason: HOSPADM

## 2024-01-17 RX ORDER — ATORVASTATIN CALCIUM 40 MG/1
40 TABLET, FILM COATED ORAL NIGHTLY
Status: DISCONTINUED | OUTPATIENT
Start: 2024-01-17 | End: 2024-01-20 | Stop reason: HOSPADM

## 2024-01-17 RX ADMIN — ENOXAPARIN SODIUM 40 MG: 100 INJECTION SUBCUTANEOUS at 14:51

## 2024-01-17 RX ADMIN — ONDANSETRON 4 MG: 2 INJECTION INTRAMUSCULAR; INTRAVENOUS at 15:01

## 2024-01-17 RX ADMIN — PIPERACILLIN AND TAZOBACTAM 3375 MG: 3; .375 INJECTION, POWDER, FOR SOLUTION INTRAVENOUS at 07:41

## 2024-01-17 RX ADMIN — ONDANSETRON 4 MG: 2 INJECTION INTRAMUSCULAR; INTRAVENOUS at 07:40

## 2024-01-17 RX ADMIN — MORPHINE SULFATE 2 MG: 2 INJECTION, SOLUTION INTRAMUSCULAR; INTRAVENOUS at 07:40

## 2024-01-17 RX ADMIN — PIPERACILLIN AND TAZOBACTAM 3375 MG: 3; .375 INJECTION, POWDER, FOR SOLUTION INTRAVENOUS at 23:50

## 2024-01-17 RX ADMIN — PANTOPRAZOLE SODIUM 40 MG: 40 INJECTION, POWDER, FOR SOLUTION INTRAVENOUS at 16:14

## 2024-01-17 RX ADMIN — SODIUM CHLORIDE: 9 INJECTION, SOLUTION INTRAVENOUS at 14:47

## 2024-01-17 RX ADMIN — ATORVASTATIN CALCIUM 40 MG: 40 TABLET, FILM COATED ORAL at 20:41

## 2024-01-17 RX ADMIN — IOPAMIDOL 93 ML: 755 INJECTION, SOLUTION INTRAVENOUS at 05:46

## 2024-01-17 RX ADMIN — SODIUM CHLORIDE, PRESERVATIVE FREE 10 ML: 5 INJECTION INTRAVENOUS at 20:41

## 2024-01-17 RX ADMIN — DEXTROSE AND SODIUM CHLORIDE: 5; 900 INJECTION, SOLUTION INTRAVENOUS at 17:55

## 2024-01-17 RX ADMIN — SODIUM CHLORIDE 1000 ML: 9 INJECTION, SOLUTION INTRAVENOUS at 05:20

## 2024-01-17 RX ADMIN — PIPERACILLIN AND TAZOBACTAM 3375 MG: 3; .375 INJECTION, POWDER, FOR SOLUTION INTRAVENOUS at 16:14

## 2024-01-17 RX ADMIN — AMLODIPINE BESYLATE 10 MG: 5 TABLET ORAL at 16:14

## 2024-01-17 ASSESSMENT — PAIN DESCRIPTION - ORIENTATION
ORIENTATION: LOWER

## 2024-01-17 ASSESSMENT — LIFESTYLE VARIABLES
HOW OFTEN DO YOU HAVE A DRINK CONTAINING ALCOHOL: MONTHLY OR LESS
HOW MANY STANDARD DRINKS CONTAINING ALCOHOL DO YOU HAVE ON A TYPICAL DAY: 1 OR 2

## 2024-01-17 ASSESSMENT — PAIN SCALES - GENERAL
PAINLEVEL_OUTOF10: 4
PAINLEVEL_OUTOF10: 0
PAINLEVEL_OUTOF10: 9
PAINLEVEL_OUTOF10: 4

## 2024-01-17 ASSESSMENT — PAIN DESCRIPTION - LOCATION
LOCATION: ABDOMEN

## 2024-01-17 ASSESSMENT — PAIN DESCRIPTION - DESCRIPTORS: DESCRIPTORS: SHARP

## 2024-01-17 NOTE — ED TRIAGE NOTES
Patient reports that he had a total knee replacement on December 16th. Patient states he was put on pain medicines and was taking laxatives with it. Patient states that he has had lower abdominal pain for the past 5 days. Patient reports that he has stopped the pain medicine but has not stopped the laxatives because he hasn't been able to have a bowel movement without it. Patient reports last bowel movement was when he left the house to come here. States he is having a bowel movement every hour which has also been going on for about 5 days.

## 2024-01-17 NOTE — H&P
History and Physical    Subjective:     Robb Enamorado is a 63 y.o.   male with a past medical history of GERD and HTN who presents to the ED with a chief complaint of lower abdominal pain. Patient reports that the abdominal pain started 5 to 6 days ago, other accompanying symptoms includes sleepless nights related to SOB where he had to sleep in a recliner chair, gets choked up after drinking water or eating, feels nausea and had some vomiting. Denies fever, chills, chest pain, palpitations, headaches, light headiness, fatigue or weakness and no diarrhea. He had a TKR on Dec 18 in [place], stopped taking pain medications and continued with laxative because felt relieved from abdomen pain after having a BM. In the ED patient was found to have a D-Dimer 3.20, UA shown trace leukocyte esterase, CT of abdomen/ chest: No acute pulmonary artery embolism or other acute abnormality in the chest, two areas of acute diverticulitis at the junction of the descending and sigmoid colon and in the sigmoid colon where there is an intramural abscess measuring 4.0 cm. Blood cultures pending. WBC: 13.3, RBC 3.80. CXR: No acute process. Recd 1000 ml of NS, Zosyn, morphine and nausea medication. Discussed case with ED provider, hospital medicine will admit the patient for further evaluation and treatment. While in the ED general surgery Dr. Traore was consulted due to the intestinal abscesses, and he recommended to transfer the patient to an higher level of care to have IR drain abscesses. Patient assessed at the bedside, patient is alert and oriented, there is no acute distress noted. Patient agrees to admission for a diagnosis of diverticular abscess, treatment to include Lovenox SQ, gently hydration, antibiotics, pain and nausea control, and cardiac monitoring. Patient has been accepted to [facility] to Dr. Ma, we are currently awaiting a bed.     Admit to telemetry.    Past Medical History:   Diagnosis  Date    FHx: colon cancer     H/O: HTN (hypertension)     Hypertension     Tobacco use       Past Surgical History:   Procedure Laterality Date    HERNIA REPAIR N/A     ORTHOPEDIC SURGERY      otho knee left arthroscopy    DE UNLISTED PROCEDURE ABDOMEN PERITONEUM & OMENTUM       Family History   Problem Relation Age of Onset    Colon Cancer Father 71      Social History     Tobacco Use    Smoking status: Former     Types: Cigarettes     Start date: 04/2023    Smokeless tobacco: Never   Substance Use Topics    Alcohol use: Yes     Alcohol/week: 15.0 standard drinks of alcohol       Prior to Admission medications    Medication Sig Start Date End Date Taking? Authorizing Provider   amLODIPine-atorvastatatin (CADUET) 10-10 MG per tablet Take 1 tablet by mouth daily    Automatic Reconciliation, Ar   atorvastatin (LIPITOR) 40 MG tablet Take by mouth daily    Automatic Reconciliation, Ar   Calcium Carbonate-Vitamin D (OYSTER SHELL CALCIUM/D) 500-5 MG-MCG TABS Take 1 tablet by mouth 2 times daily (with meals)    Automatic Reconciliation, Ar   Cholecalciferol 50 MCG (2000 UT) CAPS Take by mouth daily    Automatic Reconciliation, Ar   lisinopril (PRINIVIL;ZESTRIL) 10 MG tablet Take 20 mg by mouth daily    Automatic Reconciliation, Ar   meloxicam (MOBIC) 15 MG tablet Take 15 mg by mouth daily    Automatic Reconciliation, Ar   potassium chloride (KLOR-CON M) 20 MEQ extended release tablet Take 20 mEq by mouth daily 10/10/22   Automatic Reconciliation, Ar     Allergies   Allergen Reactions    Oxycodone Shortness Of Breath and Nausea And Vomiting          REVIEW OF SYSTEMS:       Total of 12 systems reviewed as follows:    Positive = Red  Constitutional: Negative for malaise/fatigue and weakness, negative for fever and chills   HENT: Negative for ear pain, headaches, negative for loss of sense of taste and smell   Eyes: Negative for blurred vision and double vision   Skin: Negative for itching, negative for open areas

## 2024-01-17 NOTE — ED NOTES
TRANSFER - OUT REPORT:    Verbal report given to Shelli on Robb Enamorado  being transferred to [facility] for routine progression of patient care       Report consisted of patient's Situation, Background, Assessment and   Recommendations(SBAR).     Information from the following report(s) ED Encounter Summary, ED SBAR, and MAR was reviewed with the receiving nurse.    [place] Assessment:    Presents to emergency department  because of falls (Syncope, seizure, or loss of consciousness): No  Age > 70: No  Altered Mental Status, Intoxication with alcohol or substance confusion (Disorientation, impaired judgment, poor safety awaremess, or inability to follow instructions): No  Impaired Mobility: Ambulates or transfers with assistive devices or assistance; Unable to ambulate or transer.: No  Nursing Judgement: No          Lines:   Peripheral IV 01/17/24 Right Antecubital (Active)        Opportunity for questions and clarification was provided.      Patient transported with:  Registered Nurse

## 2024-01-17 NOTE — ED PROVIDER NOTES
Cancer Father 71          SOCIAL HISTORY       Social History     Socioeconomic History    Marital status:      Spouse name: None    Number of children: None    Years of education: None    Highest education level: None   Tobacco Use    Smoking status: Former     Types: Cigarettes     Start date: 04/2023    Smokeless tobacco: Never   Substance and Sexual Activity    Alcohol use: Yes     Alcohol/week: 15.0 standard drinks of alcohol    Drug use: No     Social Determinants of Health     Financial Resource Strain: Unknown (9/27/2022)    Overall Financial Resource Strain (CARDIA)     Difficulty of Paying Living Expenses: Patient declined   Food Insecurity: Unknown (9/27/2022)    Hunger Vital Sign     Worried About Running Out of Food in the Last Year: Patient declined     Ran Out of Food in the Last Year: Patient declined   Transportation Needs: No Transportation Needs (9/27/2022)    PRAPARE - Transportation     Lack of Transportation (Medical): No     Lack of Transportation (Non-Medical): No   Housing Stability: Low Risk  (9/27/2022)    Housing Stability Vital Sign     Unable to Pay for Housing in the Last Year: No     Number of Places Lived in the Last Year: 1     Unstable Housing in the Last Year: No       SCREENINGS         [place] Coma Scale  Eye Opening: Spontaneous  Best Verbal Response: Oriented  Best Motor Response: Obeys commands  [place] Coma Scale Score: 15                     CIWA Assessment  BP: (!) 171/88  Pulse: (!) 106                 PHYSICAL EXAM    (up to 7 for level 4, 8 or more for level 5)     ED Triage Vitals [01/17/24 0430]   BP Temp Temp Source Pulse Respirations SpO2 Height Weight - Scale   (!) 172/93 98.5 °F (36.9 °C) Oral (!) 119 16 100 % 1.702 m (5' 7\") 83.9 kg (184 lb 14.4 oz)       Physical Exam  Vitals and nursing note reviewed.   Constitutional:       Appearance: Normal appearance.   HENT:      Head: Normocephalic and atraumatic.   Eyes:      Conjunctiva/sclera: Conjunctivae  normal.   Cardiovascular:      Rate and Rhythm: Tachycardia present.      Pulses: Normal pulses.   Pulmonary:      Effort: Pulmonary effort is normal.   Abdominal:      General: Abdomen is flat.      Palpations: Abdomen is soft.      Tenderness: There is abdominal tenderness. There is no guarding or rebound. Negative signs include Patel's sign.   Musculoskeletal:         General: Normal range of motion.      Cervical back: Normal range of motion.   Skin:     Coloration: Skin is not pale.   Neurological:      General: No focal deficit present.      Mental Status: He is alert.   Psychiatric:         Mood and Affect: Mood normal.         DIAGNOSTIC RESULTS     EKG: All EKG's are interpreted by the Emergency Department Physician who either signs or Co-signs this chart in the absence of a cardiologist.  St  at 119, qrs 82, axis 39, no st changes.       RADIOLOGY:   Non-plain film images such as CT, Ultrasound and MRI are read by the radiologist. Plain radiographic images are visualized and preliminarily interpreted by the emergency physician with the below findings:      Interpretation per the Radiologist below, if available at the time of this note:    CT ABDOMEN PELVIS W IV CONTRAST Additional Contrast? None   Final Result   1. No acute pulmonary artery embolism or other acute abnormality in the chest.      2. Two areas of acute diverticulitis at the junction of the descending and   sigmoid colon and in the sigmoid colon where there is an intramural abscess   measuring 4.0 cm.         CTA CHEST W WO CONTRAST   Final Result   1. No acute pulmonary artery embolism or other acute abnormality in the chest.      2. Two areas of acute diverticulitis at the junction of the descending and   sigmoid colon and in the sigmoid colon where there is an intramural abscess   measuring 4.0 cm.         XR CHEST PORTABLE   Final Result      No acute process.               LABS:  Labs Reviewed   D-DIMER, QUANTITATIVE - Abnormal; Notable

## 2024-01-17 NOTE — ED NOTES
Bedside and Verbal shift change report given to ELIJAH Felder (oncoming nurse) by ELIJAH Gilmore (offgoing nurse). Report included the following information Nurse Handoff Report, ED Encounter Summary, ED SBAR, MAR, Recent Results, and Neuro Assessment.

## 2024-01-17 NOTE — ED NOTES
Transfer center called back at about 7:27 AM, I spoke with a Dr.Vikran Ma at approximately 7:27 AM.  Who accepted the patient to medical floor with telemetry.  I did make the patient aware.     Devang Street,   01/17/24 0727

## 2024-01-18 LAB
GLUCOSE BLD STRIP.AUTO-MCNC: 105 MG/DL (ref 70–110)
GLUCOSE BLD STRIP.AUTO-MCNC: 107 MG/DL (ref 70–110)
GLUCOSE BLD STRIP.AUTO-MCNC: 87 MG/DL (ref 70–110)
GLUCOSE BLD STRIP.AUTO-MCNC: 99 MG/DL (ref 70–110)
PERFORMED BY:: NORMAL

## 2024-01-18 PROCEDURE — 82962 GLUCOSE BLOOD TEST: CPT

## 2024-01-18 PROCEDURE — 2580000003 HC RX 258: Performed by: NURSE PRACTITIONER

## 2024-01-18 PROCEDURE — C9113 INJ PANTOPRAZOLE SODIUM, VIA: HCPCS | Performed by: NURSE PRACTITIONER

## 2024-01-18 PROCEDURE — 6370000000 HC RX 637 (ALT 250 FOR IP): Performed by: NURSE PRACTITIONER

## 2024-01-18 PROCEDURE — 6360000002 HC RX W HCPCS: Performed by: NURSE PRACTITIONER

## 2024-01-18 PROCEDURE — 1100000000 HC RM PRIVATE

## 2024-01-18 RX ORDER — ACETAMINOPHEN 500 MG
500 TABLET ORAL EVERY 8 HOURS PRN
COMMUNITY

## 2024-01-18 RX ORDER — CLINDAMYCIN PHOSPHATE 11.9 MG/ML
1 SOLUTION TOPICAL 2 TIMES DAILY
COMMUNITY

## 2024-01-18 RX ORDER — MELOXICAM 15 MG/1
15 TABLET ORAL DAILY PRN
COMMUNITY

## 2024-01-18 RX ADMIN — SODIUM CHLORIDE, PRESERVATIVE FREE 10 ML: 5 INJECTION INTRAVENOUS at 20:03

## 2024-01-18 RX ADMIN — PIPERACILLIN AND TAZOBACTAM 3375 MG: 3; .375 INJECTION, POWDER, FOR SOLUTION INTRAVENOUS at 08:25

## 2024-01-18 RX ADMIN — PIPERACILLIN AND TAZOBACTAM 3375 MG: 3; .375 INJECTION, POWDER, FOR SOLUTION INTRAVENOUS at 15:44

## 2024-01-18 RX ADMIN — ENOXAPARIN SODIUM 40 MG: 100 INJECTION SUBCUTANEOUS at 08:25

## 2024-01-18 RX ADMIN — PIPERACILLIN AND TAZOBACTAM 3375 MG: 3; .375 INJECTION, POWDER, FOR SOLUTION INTRAVENOUS at 23:36

## 2024-01-18 RX ADMIN — SODIUM CHLORIDE, PRESERVATIVE FREE 10 ML: 5 INJECTION INTRAVENOUS at 08:25

## 2024-01-18 RX ADMIN — PANTOPRAZOLE SODIUM 40 MG: 40 INJECTION, POWDER, FOR SOLUTION INTRAVENOUS at 08:25

## 2024-01-18 RX ADMIN — DEXTROSE AND SODIUM CHLORIDE: 5; 900 INJECTION, SOLUTION INTRAVENOUS at 03:08

## 2024-01-18 RX ADMIN — DEXTROSE AND SODIUM CHLORIDE: 5; 900 INJECTION, SOLUTION INTRAVENOUS at 15:11

## 2024-01-18 RX ADMIN — AMLODIPINE BESYLATE 10 MG: 5 TABLET ORAL at 08:25

## 2024-01-18 RX ADMIN — ATORVASTATIN CALCIUM 40 MG: 40 TABLET, FILM COATED ORAL at 20:03

## 2024-01-18 ASSESSMENT — PAIN SCALES - GENERAL: PAINLEVEL_OUTOF10: 0

## 2024-01-18 NOTE — PROGRESS NOTES
Admission Medication Reconciliation:    Information obtained from:  patient and office visit notes from 11/15/23    Comments/Recommendations: Reviewed PTA medications and patient's allergies.    Verified medication list using office visit notes from Karin Mueller on 11/15/23.  Confirmed with patient that this medication list was correct, however some medication needed to be change to prn.        Allergies:  Oxycodone    Significant PMH/Disease States:   Past Medical History:   Diagnosis Date    FHx: colon cancer     H/O: HTN (hypertension)     Hypertension     Tobacco use      Chief Complaint for this Admission:    Chief Complaint   Patient presents with    Abdominal Pain     Prior to Admission Medications:   Prior to Admission medications    Medication Sig Start Date End Date Taking? Authorizing Provider   acetaminophen (TYLENOL) 500 MG tablet Take 1 tablet by mouth every 8 hours as needed for Pain   Yes Provider, MD Jr   meloxicam (MOBIC) 15 MG tablet Take 1 tablet by mouth daily as needed for Pain   Yes Provider, MD Jr   clindamycin (CLEOCIN T) 1 % external solution Apply 1 Application topically 2 times daily Apply topically to affected area 2 times daily as needed   Yes Provider, MD Jr   omeprazole (PRILOSEC) 20 MG delayed release capsule Take 1 capsule by mouth daily   Yes Provider, MD Jr   losartan (COZAAR) 50 MG tablet Take 1 tablet by mouth daily   Yes Provider, MD Jr   amLODIPine (NORVASC) 10 MG tablet Take 1 tablet by mouth daily   Yes Provider, MD Jr   aspirin 325 MG EC tablet Take 1 tablet by mouth daily   Yes Provider, MD Jr   spironolactone (ALDACTONE) 25 MG tablet Take 0.5 tablets by mouth daily   Yes ProviderJr MD   atorvastatin (LIPITOR) 40 MG tablet Take 1 tablet by mouth nightly    Automatic Reconciliation, Ar   potassium chloride (KLOR-CON M) 20 MEQ extended release tablet Take 20 mEq by mouth daily 10/10/22 1/18/24

## 2024-01-18 NOTE — PROGRESS NOTES
Bed still pending at [facility] for IR drainage of abscess  RN reports Pt now fussing and angry at not eating anything since yesterday--he is aware of need for NPO but he will like to have some clears. Clear liquid diet ordered

## 2024-01-18 NOTE — PROGRESS NOTES
Hospitalist Progress Note             Date of Service:  2024  NAME:  Robb Enamorado  :  1960  MRN:  [id number]    Assessment & Plan:      Diverticular abscess  -CT abdomen: Two areas of acute diverticulitis at the junction of the descending and sigmoid colon and in the sigmoid colon where there is an intramural abscess measuring 4.0 cm.   - General surgery consulted and recommend transfer to an higher level of care for possible drainage of abscess  - NPO  - IV hydration NS at 100 ml /hr   - continuous cardiac monitoring  -covering with Zosyn 3.375 g every 6 hours IV  -blood cultures- ngtd  -continue with Glycolax PO  -pain with morphine and nausea control  -consulted GI and transfer to [facility] for IR     Elevated D-Dimer  -CTA chest negative for pulmonary embolism      Hypertension  -continue with Amlodipine and Losartan  -monitoring blood pressure closely     GERD  - monitor and receive nausea medication PRN  -will continue Protonix via IV for now     Hyperlipidemia  -chronic, continue Lipitor      Npo while we await bed, if no bed available soon, open up search to [facility] and [facility]ici.    Review of Systems:   Pertinent items are noted in HPI.       Vital Signs:    Last 24hrs VS reviewed since prior progress note. Most recent are:  Vitals:    24 0800   BP:    Pulse: 90   Resp:    Temp:    SpO2:          Intake/Output Summary (Last 24 hours) at 2024 0935  Last data filed at 2024 0622  Gross per 24 hour   Intake --   Output 900 ml   Net -900 ml        Physical Examination:             General:          Alert, cooperative, no distress, appears stated age.     HEENT:           Atraumatic, anicteric sclerae, pink conjunctivae                          No oral ulcers, mucosa moist, throat clear, dentition fair  Neck:               Supple, symmetrical  Lungs:             Clear to auscultation  ondansetron (ZOFRAN) injection 4 mg  4 mg IntraVENous Q6H PRN    polyethylene glycol (GLYCOLAX) packet 17 g  17 g Oral Daily PRN    acetaminophen (TYLENOL) tablet 650 mg  650 mg Oral Q6H PRN    Or    acetaminophen (TYLENOL) suppository 650 mg  650 mg Rectal Q6H PRN    piperacillin-tazobactam (ZOSYN) 3,375 mg in sodium chloride 0.9 % 50 mL IVPB ([id number])  3,375 mg IntraVENous Q8H    morphine (PF) injection 1 mg  1 mg IntraVENous Q4H PRN    pantoprazole (PROTONIX) injection 40 mg  40 mg IntraVENous Daily    hydrALAZINE (APRESOLINE) injection 10 mg  10 mg IntraVENous Q6H PRN    amLODIPine (NORVASC) tablet 10 mg  10 mg Oral Daily    atorvastatin (LIPITOR) tablet 40 mg  40 mg Oral Nightly    dextrose 5 % and 0.9 % sodium chloride infusion   IntraVENous Continuous    glucose chewable tablet 16 g  4 tablet Oral PRN    dextrose bolus 10% 125 mL  125 mL IntraVENous PRN    Or    dextrose bolus 10% 250 mL  250 mL IntraVENous PRN    glucagon injection 1 mg  1 mg SubCUTAneous PRN    dextrose 10 % infusion   IntraVENous Continuous PRN     ______________________________________________________________________  EXPECTED LENGTH OF STAY: 2  ACTUAL LENGTH OF STAY:          1                 Hernan Brooks MD

## 2024-01-18 NOTE — PROGRESS NOTES
1915  Received care of pt sitting up in bed watching TV.  Pt is alert and oriented x 4. Routine assessment and vs completed. Pt is complaint free at this time.  Pt denies pain.  IVF's contiue.  Call bell in reach.    2100  HS med accepted with sip of water.    2345  Pt sleeping and awakens easily for vitals. Pt is complaint free at this time.    0200 Sleeping with no distress noted.    0600  Pt up to BR and back to bed. Pt is complaint free at this time.  Pt remains NPO excepts sips with meds.  Blood sugars remain stable.

## 2024-01-18 NOTE — PROGRESS NOTES
0645- Assumed care of patient from off going nurse.     0705- Patient up ambulating in room. IVF infusing as ordered. Head to toe shift assessment complete and vitals obtained. Patient denies any needs/pain at this time. CB in reach     0825- Administered AM medications with no complaints. Wife at bedside. CB in reach     1230- Patient resting in bed. No distress noted at this time. Patient denies any needs/pain at this time. CB in reach     1600- Patient resting in bed no distress noted at this time. Denies any needs. CB in reach

## 2024-01-18 NOTE — PLAN OF CARE
Problem: Discharge Planning  Goal: Discharge to home or other facility with appropriate resources  1/18/2024 0721 by Agueda Martinez RN  Outcome: Progressing  1/18/2024 0720 by Agueda Martinez RN  Outcome: Progressing     Problem: Pain  Goal: Verbalizes/displays adequate comfort level or baseline comfort level  1/18/2024 0721 by Agueda Martinez RN  Outcome: Progressing  1/18/2024 0720 by Agueda Martinez RN  Outcome: Progressing     Problem: Chronic Conditions and Co-morbidities  Goal: Patient's chronic conditions and co-morbidity symptoms are monitored and maintained or improved  Outcome: Progressing     Problem: Safety - Adult  Goal: Free from fall injury  Outcome: Progressing

## 2024-01-18 NOTE — PROGRESS NOTES
Spoke with transfer center and asked for search to be widened since no beds available at [facility] and no ETA on a bed. Search widened per Hospitalist request.

## 2024-01-18 NOTE — CARE COORDINATION
Case Management Assessment  Initial Evaluation    Date/Time of Evaluation: 1/18/2024 10:38 AM  Assessment Completed by: Margot Ahumada    If patient is discharged prior to next notation, then this note serves as note for discharge by case management.    Patient Name: Robb Enamorado                   YOB: 1960  Diagnosis: Essential hypertension [I10]  Diverticulitis of colon [K57.32]  Intestinal diverticular abscess [K63.0]  Colonic diverticular abscess [K57.20]                   Date / Time: 1/17/2024  4:28 AM    Patient Admission Status: Inpatient   Readmission Risk (Low < 19, Mod (19-27), High > 27): Readmission Risk Score: 12    Current PCP: Eren Cyr, TEJINDER - NP  PCP verified by CM?      Chart Reviewed: Yes      History Provided by:    Patient Orientation:      Patient Cognition:      Hospitalization in the last 30 days (Readmission):  No    If yes, Readmission Assessment in CM Navigator will be completed.    Advance Directives:      Code Status: Full Code   Patient's Primary Decision Maker is:        Discharge Planning:    Patient lives with: Spouse/Significant Other Type of Home: House  Primary Care Giver:    Patient Support Systems include: Spouse/Significant Other   Current Financial resources:    Current community resources:    Current services prior to admission: None            Current DME:              Type of Home Care services:  None    ADLS  Prior functional level:    Current functional level:      PT AM-PAC:   /24  OT AM-PAC:   /24    Family can provide assistance at DC:    Would you like Case Management to discuss the discharge plan with any other family members/significant others, and if so, who?    Plans to Return to Present Housing:    Other Identified Issues/Barriers to RETURNING to current housing: yes after hospital stay/transfer  Potential Assistance needed at discharge: N/A            Potential DME:    Patient expects to discharge to: Other (comment)  Plan for  transportation at discharge: Family    Financial    Payor: [employer] / Plan: [employer] / Product Type: *No Product type* /     Does insurance require precert for SNF: Yes    Potential assistance Purchasing Medications: No  Meds-to-Beds request:        [id number] - [place], [place] - [address] [place] [address] [address] Getachew SANDS [phone] - F [phone]  [address] [facility] [address] [place] [address]  Phone: [phone] Fax: [phone]      Notes:    Factors facilitating achievement of predicted outcomes: Family support, Cooperative, and Pleasant    Barriers to discharge: Medical complications    Additional Case Management Notes:   RUR 12%:  Plan of care includes medication reconciliation to be complete, education will be given with teach back method, and will identify need for post-acute care follow up.  Patient centered discharge planning to ensure smooth transition to community and OF.  Patient lives at home with wife and is independent of ADLs, no DME or HH use or needs.  Drives and works and has transportation home when ready for DC.  CM following for DC needs.  DC POC is pt will return home with wife after hospital stay/transfer.      The Plan for Transition of Care is related to the following treatment goals of Essential hypertension [I10]  Diverticulitis of colon [K57.32]  Intestinal diverticular abscess [K63.0]  Colonic diverticular abscess [K57.20]    IF APPLICABLE: The Patient and/or patient representative Robb and his family were provided with a choice of provider and agrees with the discharge plan. Freedom of choice list with basic dialogue that supports the patient's individualized plan of care/goals and shares the quality data associated with the providers was provided to:     Patient Representative Name:       The Patient and/or Patient Representative Agree with the Discharge Plan?      Margot Ahumada  Case Management Department  Ph: [phone] Fax:

## 2024-01-18 NOTE — PROGRESS NOTES
Spoke with transfer center and was advised [facility] is waiting on discharges. Also advised that every [facility] has the same status. Transfer center is going to look outside of [facility] as well as keeping [facility] transfer open.

## 2024-01-18 NOTE — PROGRESS NOTES
conducted an initial consultation and Spiritual Assessment for Robb Enamorado, who is a 63 y.o.,male. Patient’s Primary Language is: English.   According to the patient’s EMR Restorationism Affiliation is: Church.     The reason the Patient came to the hospital is:   Patient Active Problem List    Diagnosis Date Noted    Intestinal diverticular abscess 01/17/2024    Mixed hyperlipidemia 09/27/2022    Essential hypertension 09/27/2022    Vitamin D deficiency 09/27/2022    Generalized osteoarthritis of multiple sites 09/27/2022        The  provided the following Interventions:  Initiated a relationship of care and support.   Explored issues of wiliam, belief, spirituality and Alevism/ritual needs while hospitalized.  Listened empathically.  Provided chaplaincy education.  Provided information about Spiritual Care Services.  Offered prayer and assurance of continued prayers on patient's behalf.   Chart reviewed.    The following outcomes where achieved:  Patient shared limited information about both their medical narrative and spiritual journey/beliefs.   confirmed Patient's Restorationism Affiliation.  Patient processed feeling about current hospitalization.  Patient expressed gratitude for 's visit.    Assessment:  Patient does not have any Alevism/cultural needs that will affect patient’s preferences in health care.  There are no spiritual or Alevism issues which require intervention at this time.     Plan:  Chaplains will continue to follow and will provide pastoral care on an as needed/requested basis.   recommends bedside caregivers page  on duty if patient shows signs of acute spiritual or emotional distress.    Chaplain Eliana Dillard  [facility]  [phone]                Chaplain Melissa Leos  Spiritual Care   [phone]

## 2024-01-19 LAB
ANION GAP SERPL CALC-SCNC: 7 MMOL/L (ref 3–18)
BASOPHILS # BLD: 0.1 K/UL (ref 0–0.1)
BASOPHILS NFR BLD: 1 % (ref 0–2)
BUN SERPL-MCNC: 8 MG/DL (ref 7–18)
BUN/CREAT SERPL: 7 (ref 12–20)
CA-I BLD-MCNC: 9.4 MG/DL (ref 8.5–10.1)
CHLORIDE SERPL-SCNC: 110 MMOL/L (ref 100–111)
CO2 SERPL-SCNC: 23 MMOL/L (ref 21–32)
CREAT SERPL-MCNC: 1.23 MG/DL (ref 0.6–1.3)
DIFFERENTIAL METHOD BLD: ABNORMAL
EOSINOPHIL # BLD: 0.2 K/UL (ref 0–0.4)
EOSINOPHIL NFR BLD: 3 % (ref 0–5)
ERYTHROCYTE [DISTWIDTH] IN BLOOD BY AUTOMATED COUNT: 14 % (ref 11.6–14.5)
GLUCOSE BLD STRIP.AUTO-MCNC: 146 MG/DL (ref 70–110)
GLUCOSE SERPL-MCNC: 111 MG/DL (ref 74–99)
HCT VFR BLD AUTO: 31.6 % (ref 36–48)
HGB BLD-MCNC: 9.9 G/DL (ref 13–16)
IMM GRANULOCYTES # BLD AUTO: 0.1 K/UL (ref 0–0.04)
IMM GRANULOCYTES NFR BLD AUTO: 1 % (ref 0–0.5)
LYMPHOCYTES # BLD: 1.1 K/UL (ref 0.9–3.6)
LYMPHOCYTES NFR BLD: 17 % (ref 21–52)
MAGNESIUM SERPL-MCNC: 2.1 MG/DL (ref 1.6–2.6)
MCH RBC QN AUTO: 26.9 PG (ref 24–34)
MCHC RBC AUTO-ENTMCNC: 31.3 G/DL (ref 31–37)
MCV RBC AUTO: 85.9 FL (ref 78–100)
MONOCYTES # BLD: 0.9 K/UL (ref 0.05–1.2)
MONOCYTES NFR BLD: 14 % (ref 3–10)
NEUTS SEG # BLD: 4.2 K/UL (ref 1.8–8)
NEUTS SEG NFR BLD: 64 % (ref 40–73)
NRBC # BLD: 0 K/UL (ref 0–0.01)
NRBC BLD-RTO: 0 PER 100 WBC
PERFORMED BY:: ABNORMAL
PHOSPHATE SERPL-MCNC: 3.3 MG/DL (ref 2.5–4.9)
PLATELET # BLD AUTO: 358 K/UL (ref 135–420)
PMV BLD AUTO: 10.1 FL (ref 9.2–11.8)
POTASSIUM SERPL-SCNC: 3.9 MMOL/L (ref 3.5–5.5)
RBC # BLD AUTO: 3.68 M/UL (ref 4.35–5.65)
SODIUM SERPL-SCNC: 140 MMOL/L (ref 136–145)
WBC # BLD AUTO: 6.6 K/UL (ref 4.6–13.2)

## 2024-01-19 PROCEDURE — 6360000002 HC RX W HCPCS: Performed by: NURSE PRACTITIONER

## 2024-01-19 PROCEDURE — 80048 BASIC METABOLIC PNL TOTAL CA: CPT

## 2024-01-19 PROCEDURE — 84100 ASSAY OF PHOSPHORUS: CPT

## 2024-01-19 PROCEDURE — 83735 ASSAY OF MAGNESIUM: CPT

## 2024-01-19 PROCEDURE — 2580000003 HC RX 258: Performed by: NURSE PRACTITIONER

## 2024-01-19 PROCEDURE — 85025 COMPLETE CBC W/AUTO DIFF WBC: CPT

## 2024-01-19 PROCEDURE — C9113 INJ PANTOPRAZOLE SODIUM, VIA: HCPCS | Performed by: NURSE PRACTITIONER

## 2024-01-19 PROCEDURE — 82962 GLUCOSE BLOOD TEST: CPT

## 2024-01-19 PROCEDURE — 6370000000 HC RX 637 (ALT 250 FOR IP): Performed by: NURSE PRACTITIONER

## 2024-01-19 PROCEDURE — 1100000000 HC RM PRIVATE

## 2024-01-19 PROCEDURE — 36415 COLL VENOUS BLD VENIPUNCTURE: CPT

## 2024-01-19 RX ADMIN — SODIUM CHLORIDE, PRESERVATIVE FREE 10 ML: 5 INJECTION INTRAVENOUS at 08:29

## 2024-01-19 RX ADMIN — DEXTROSE AND SODIUM CHLORIDE: 5; 900 INJECTION, SOLUTION INTRAVENOUS at 16:12

## 2024-01-19 RX ADMIN — PIPERACILLIN AND TAZOBACTAM 3375 MG: 3; .375 INJECTION, POWDER, FOR SOLUTION INTRAVENOUS at 23:56

## 2024-01-19 RX ADMIN — ATORVASTATIN CALCIUM 40 MG: 40 TABLET, FILM COATED ORAL at 19:36

## 2024-01-19 RX ADMIN — ENOXAPARIN SODIUM 40 MG: 100 INJECTION SUBCUTANEOUS at 08:29

## 2024-01-19 RX ADMIN — PIPERACILLIN AND TAZOBACTAM 3375 MG: 3; .375 INJECTION, POWDER, FOR SOLUTION INTRAVENOUS at 16:12

## 2024-01-19 RX ADMIN — DEXTROSE AND SODIUM CHLORIDE: 5; 900 INJECTION, SOLUTION INTRAVENOUS at 05:25

## 2024-01-19 RX ADMIN — PANTOPRAZOLE SODIUM 40 MG: 40 INJECTION, POWDER, FOR SOLUTION INTRAVENOUS at 08:29

## 2024-01-19 RX ADMIN — PIPERACILLIN AND TAZOBACTAM 3375 MG: 3; .375 INJECTION, POWDER, FOR SOLUTION INTRAVENOUS at 08:29

## 2024-01-19 RX ADMIN — SODIUM CHLORIDE, PRESERVATIVE FREE 10 ML: 5 INJECTION INTRAVENOUS at 19:36

## 2024-01-19 RX ADMIN — AMLODIPINE BESYLATE 10 MG: 5 TABLET ORAL at 08:29

## 2024-01-19 ASSESSMENT — PAIN SCALES - GENERAL
PAINLEVEL_OUTOF10: 0
PAINLEVEL_OUTOF10: 0

## 2024-01-19 NOTE — PROGRESS NOTES
0700- Assumed care of patient from off going nurse. Patient resting in bed with eyes closed. No distress noted at this time. Shift assessment complete. Vitals obtained. CB in reach.     0829- Administered AM medications with sips of water. Patient denies any needs at this time. CB in reach     1200- Patient resting in bed with wife at bedside. Patient denies any needs at this time. CB in reach     1530- patient sitting up in bed watching tv. Ivf infusing. Patient denies any current needs/concerns CB in reach

## 2024-01-19 NOTE — PROGRESS NOTES
times per day    sodium chloride flush 0.9 % injection 5-40 mL  5-40 mL IntraVENous PRN    enoxaparin (LOVENOX) injection 40 mg  40 mg SubCUTAneous Daily    ondansetron (ZOFRAN-ODT) disintegrating tablet 4 mg  4 mg Oral Q8H PRN    Or    ondansetron (ZOFRAN) injection 4 mg  4 mg IntraVENous Q6H PRN    polyethylene glycol (GLYCOLAX) packet 17 g  17 g Oral Daily PRN    acetaminophen (TYLENOL) tablet 650 mg  650 mg Oral Q6H PRN    Or    acetaminophen (TYLENOL) suppository 650 mg  650 mg Rectal Q6H PRN    piperacillin-tazobactam (ZOSYN) 3,375 mg in sodium chloride 0.9 % 50 mL IVPB ([id number])  3,375 mg IntraVENous Q8H    morphine (PF) injection 1 mg  1 mg IntraVENous Q4H PRN    pantoprazole (PROTONIX) injection 40 mg  40 mg IntraVENous Daily    hydrALAZINE (APRESOLINE) injection 10 mg  10 mg IntraVENous Q6H PRN    amLODIPine (NORVASC) tablet 10 mg  10 mg Oral Daily    atorvastatin (LIPITOR) tablet 40 mg  40 mg Oral Nightly    dextrose 5 % and 0.9 % sodium chloride infusion   IntraVENous Continuous    glucose chewable tablet 16 g  4 tablet Oral PRN    dextrose bolus 10% 125 mL  125 mL IntraVENous PRN    Or    dextrose bolus 10% 250 mL  250 mL IntraVENous PRN    glucagon injection 1 mg  1 mg SubCUTAneous PRN    dextrose 10 % infusion   IntraVENous Continuous PRN     ______________________________________________________________________  EXPECTED LENGTH OF STAY: 2  ACTUAL LENGTH OF STAY:          2                 Hernan Brooks MD

## 2024-01-20 VITALS
HEIGHT: 67 IN | BODY MASS INDEX: 28.79 KG/M2 | WEIGHT: 183.4 LBS | SYSTOLIC BLOOD PRESSURE: 156 MMHG | HEART RATE: 90 BPM | RESPIRATION RATE: 17 BRPM | OXYGEN SATURATION: 97 % | DIASTOLIC BLOOD PRESSURE: 96 MMHG | TEMPERATURE: 97.8 F

## 2024-01-20 LAB
ANION GAP SERPL CALC-SCNC: 7 MMOL/L (ref 3–18)
BASOPHILS # BLD: 0.1 K/UL (ref 0–0.1)
BASOPHILS NFR BLD: 1 % (ref 0–2)
BUN SERPL-MCNC: 5 MG/DL (ref 7–18)
BUN/CREAT SERPL: 4 (ref 12–20)
CA-I BLD-MCNC: 9.5 MG/DL (ref 8.5–10.1)
CHLORIDE SERPL-SCNC: 110 MMOL/L (ref 100–111)
CO2 SERPL-SCNC: 22 MMOL/L (ref 21–32)
CREAT SERPL-MCNC: 1.15 MG/DL (ref 0.6–1.3)
DIFFERENTIAL METHOD BLD: ABNORMAL
EOSINOPHIL # BLD: 0.3 K/UL (ref 0–0.4)
EOSINOPHIL NFR BLD: 4 % (ref 0–5)
ERYTHROCYTE [DISTWIDTH] IN BLOOD BY AUTOMATED COUNT: 14 % (ref 11.6–14.5)
GLUCOSE SERPL-MCNC: 103 MG/DL (ref 74–99)
HCT VFR BLD AUTO: 32 % (ref 36–48)
HGB BLD-MCNC: 10.2 G/DL (ref 13–16)
IMM GRANULOCYTES # BLD AUTO: 0 K/UL (ref 0–0.04)
IMM GRANULOCYTES NFR BLD AUTO: 1 % (ref 0–0.5)
LYMPHOCYTES # BLD: 1.3 K/UL (ref 0.9–3.6)
LYMPHOCYTES NFR BLD: 18 % (ref 21–52)
MAGNESIUM SERPL-MCNC: 2.1 MG/DL (ref 1.6–2.6)
MCH RBC QN AUTO: 27.3 PG (ref 24–34)
MCHC RBC AUTO-ENTMCNC: 31.9 G/DL (ref 31–37)
MCV RBC AUTO: 85.6 FL (ref 78–100)
MONOCYTES # BLD: 0.6 K/UL (ref 0.05–1.2)
MONOCYTES NFR BLD: 9 % (ref 3–10)
NEUTS SEG # BLD: 4.8 K/UL (ref 1.8–8)
NEUTS SEG NFR BLD: 67 % (ref 40–73)
NRBC # BLD: 0 K/UL (ref 0–0.01)
NRBC BLD-RTO: 0 PER 100 WBC
PHOSPHATE SERPL-MCNC: 3.2 MG/DL (ref 2.5–4.9)
PLATELET # BLD AUTO: 330 K/UL (ref 135–420)
PMV BLD AUTO: 10.3 FL (ref 9.2–11.8)
POTASSIUM SERPL-SCNC: 3.8 MMOL/L (ref 3.5–5.5)
RBC # BLD AUTO: 3.74 M/UL (ref 4.35–5.65)
SODIUM SERPL-SCNC: 139 MMOL/L (ref 136–145)
WBC # BLD AUTO: 7 K/UL (ref 4.6–13.2)

## 2024-01-20 PROCEDURE — 36415 COLL VENOUS BLD VENIPUNCTURE: CPT

## 2024-01-20 PROCEDURE — C9113 INJ PANTOPRAZOLE SODIUM, VIA: HCPCS | Performed by: NURSE PRACTITIONER

## 2024-01-20 PROCEDURE — 84100 ASSAY OF PHOSPHORUS: CPT

## 2024-01-20 PROCEDURE — 6360000002 HC RX W HCPCS: Performed by: NURSE PRACTITIONER

## 2024-01-20 PROCEDURE — 2580000003 HC RX 258: Performed by: NURSE PRACTITIONER

## 2024-01-20 PROCEDURE — 6370000000 HC RX 637 (ALT 250 FOR IP): Performed by: NURSE PRACTITIONER

## 2024-01-20 PROCEDURE — 83735 ASSAY OF MAGNESIUM: CPT

## 2024-01-20 PROCEDURE — 80048 BASIC METABOLIC PNL TOTAL CA: CPT

## 2024-01-20 PROCEDURE — 85025 COMPLETE CBC W/AUTO DIFF WBC: CPT

## 2024-01-20 RX ORDER — LOSARTAN POTASSIUM 25 MG/1
50 TABLET ORAL DAILY
Status: DISCONTINUED | OUTPATIENT
Start: 2024-01-20 | End: 2024-01-20 | Stop reason: HOSPADM

## 2024-01-20 RX ADMIN — SODIUM CHLORIDE, PRESERVATIVE FREE 10 ML: 5 INJECTION INTRAVENOUS at 09:41

## 2024-01-20 RX ADMIN — AMLODIPINE BESYLATE 10 MG: 5 TABLET ORAL at 09:35

## 2024-01-20 RX ADMIN — PIPERACILLIN AND TAZOBACTAM 3375 MG: 3; .375 INJECTION, POWDER, FOR SOLUTION INTRAVENOUS at 09:36

## 2024-01-20 RX ADMIN — PANTOPRAZOLE SODIUM 40 MG: 40 INJECTION, POWDER, FOR SOLUTION INTRAVENOUS at 09:36

## 2024-01-20 RX ADMIN — LOSARTAN POTASSIUM 50 MG: 25 TABLET, FILM COATED ORAL at 11:45

## 2024-01-20 RX ADMIN — ACETAMINOPHEN 650 MG: 325 TABLET ORAL at 11:49

## 2024-01-20 ASSESSMENT — PAIN SCALES - WONG BAKER: WONGBAKER_NUMERICALRESPONSE: 0

## 2024-01-20 ASSESSMENT — PAIN DESCRIPTION - ORIENTATION: ORIENTATION: RIGHT

## 2024-01-20 ASSESSMENT — PAIN SCALES - GENERAL
PAINLEVEL_OUTOF10: 0
PAINLEVEL_OUTOF10: 0
PAINLEVEL_OUTOF10: 2

## 2024-01-20 ASSESSMENT — PAIN - FUNCTIONAL ASSESSMENT: PAIN_FUNCTIONAL_ASSESSMENT: ACTIVITIES ARE NOT PREVENTED

## 2024-01-20 ASSESSMENT — PAIN DESCRIPTION - DESCRIPTORS: DESCRIPTORS: ACHING

## 2024-01-20 ASSESSMENT — PAIN DESCRIPTION - LOCATION: LOCATION: KNEE

## 2024-01-20 NOTE — PROGRESS NOTES
Shift note for 7pm to 7am    1850- Change in care report received at the bedside from Kayleen. Patient questions were answered including an update on transfer status. Patient denied pain at this time. Family is at the bedside. Patient repositions self in bed as needed. Patient ambulates to the bathroom ad sarai independently. Call bell is within reach.     1910- Patient was assessed as noted in flowsheet.     2010- Zosyn stopped    2205- Rounding completed, patient was resting in bed with eyes open. Patient denied pain at this time. Patient is ambulatory ad sarai to bathroom independently.     2356- Zosyn initiated    2357- Patient was reassessed for changes, none noted.     0210- Rounding completed. Patient was sleeping and showed no signs of pain or distress at this time.     0400- Patient was reassessed for changes, none noted.     0540- Patient was resting in bed with eyes open, responded to verbal stimuli appropriately. Patient denied pain at this time. Patient reported continued loose stools.     0645- Change in shift report given to Nicki.

## 2024-01-20 NOTE — PROGRESS NOTES
Hospitalist Progress Note             Date of Service:  2024  NAME:  Robb Enamorado  :  1960  MRN:  [id number]    Assessment & Plan:      Diverticular abscess- awaiting placement at facility with IR, no evidence of developing sepsis, wbc cont ot improve, cont iv abx, search expanded for bed with access to IR  -CT abdomen: Two areas of acute diverticulitis at the junction of the descending and sigmoid colon and in the sigmoid colon where there is an intramural abscess measuring 4.0 cm.   - General surgery consulted and recommend transfer to an higher level of care for possible drainage of abscess  - NPO  - IV hydration NS at 100 ml /hr   - continuous cardiac monitoring  -covering with Zosyn 3.375 g every 6 hours IV  -blood cultures- ngtd at 3 days  -continue with Glycolax PO  -pain with morphine and nausea control  - wbc has improved      Elevated D-Dimer  -CTA chest negative for pulmonary embolism      Hypertension  -continue with Amlodipine and Losartan  -monitoring blood pressure closely     GERD  - monitor and receive nausea medication PRN  -will continue Protonix via IV for now     Hyperlipidemia  -chronic, continue Lipitor        Pt allowed to eat at noon if still no progress on bed,       Review of Systems:   Pertinent items are noted in HPI.       Vital Signs:    Last 24hrs VS reviewed since prior progress note. Most recent are:  Vitals:    24 0900   BP:    Pulse: (!) 104   Resp:    Temp:    SpO2:          Intake/Output Summary (Last 24 hours) at 2024 0937  Last data filed at 2024 0545  Gross per 24 hour   Intake 250 ml   Output --   Net 250 ml        Physical Examination:             General:          Alert, cooperative, no distress, appears stated age.     HEENT:           Atraumatic, anicteric sclerae, pink conjunctivae                          No oral ulcers, mucosa moist, throat  clear, dentition fair  Neck:               Supple, symmetrical  Lungs:             Clear to auscultation bilaterally.  No Wheezing or Rhonchi. No rales.  Chest wall:      No tenderness  No Accessory muscle use.  Heart:              Regular  rhythm,  No  murmur   No edema  Abdomen:        Soft,+ lower abdominal-tender. Not distended.  Bowel sounds normal  Extremities:     No cyanosis.  No clubbing,                            Skin turgor normal, Capillary refill normal  Skin:                Not pale.  Not Jaundiced  No rashes   Psych:             Not anxious or agitated.  Neurologic:      Alert, moves all extremities, answers questions appropriately and responds to commands        Data Review:    Review and/or order of clinical lab test  Review and/or order of tests in the radiology section of [facility]  Review and/or order of tests in the medicine section of [facility]      Labs:     Recent Labs     01/19/24 0430 01/20/24  0530   WBC 6.6 7.0   HGB 9.9* 10.2*   HCT 31.6* 32.0*    330     Recent Labs     01/19/24 0430 01/20/24  0530    139   K 3.9 3.8    110   CO2 23 22   BUN 8 5*   MG 2.1 2.1   PHOS 3.3 3.2     No results for input(s): \"ALT\", \"TP\", \"ALB\", \"GLOB\", \"GGT\", \"AML\" in the last 72 hours.    Invalid input(s): \"SGOT\", \"GPT\", \"AP\", \"TBIL\", \"TBILI\", \"AMYP\", \"LPSE\", \"HLPSE\"  No results for input(s): \"INR\", \"APTT\" in the last 72 hours.    Invalid input(s): \"PTP\"   No results for input(s): \"TIBC\", \"FERR\" in the last 72 hours.    Invalid input(s): \"FE\", \"PSAT\"   No results found for: \"FOL\", \"RBCF\"   No results for input(s): \"PH\", \"PCO2\", \"PO2\" in the last 72 hours.  No results for input(s): \"CPK\" in the last 72 hours.    Invalid input(s): \"CPKMB\", \"CKNDX\", \"TROIQ\"  No results found for: \"CHOL\", \"CHOLX\", \"CHLST\", \"CHOLV\", \"HDL\", \"HDLC\", \"LDL\", \"LDLC\", \"TGLX\", \"TRIGL\"  No results found for: \"GLUCPOC\"  [unfilled]      Medications Reviewed:     Current Facility-Administered Medications   Medication Dose Route

## 2024-01-20 NOTE — PROGRESS NOTES
Received report from WANDA Abreu RN  0700- Assumed care of patient. Patient resting with eyes open. No distress noted, patient has no complaints at this time. Call bell within reach. Bed in lowest position. Wife at bedside.     0745- Assessment completed. Vital signs obtained. VSS.     0900- Patient up to RR to bathe himself. Patient ambulating around the room. CBWR.     0945- Patient medicated according to MAR. Patient tolerated well. CBWR.     1100- Vital signs obtained. CBWR.     1145- Pt given BP meds. Losartan restarted d/t creatine levels improved.     1149- Pt complaining of pain in R knee. Pt medicated with tylenol.     1230- Pt up exercising in room. D/C tele per  orders.    1250- Report called to [facility].     1315- EMTALA completed.    1408- [facility] transporting patient to [place] via stretcher.

## 2024-01-20 NOTE — PROGRESS NOTES
Physician Progress Note      PATIENT:               IVONE BOOKER  CSN #:                  [id number]  :                       1960  ADMIT DATE:       2024 4:28 AM  DISCH DATE:  RESPONDING  PROVIDER #:        Karli PIERRE          QUERY TEXT:    Pt admitted with Diverticular abscess . Pt noted to have WBC 13.3 and HR>90 up   to 119. If possible, please document in the progress notes and discharge   summary if you are evaluating and /or treating any of the following:    The medical record reflects the following:    Risk Factors: 64 y/o M, Diverticular abscess,    Clinical Indicators:  * Diverticular abscess  * WBC-13.3  * HR>90 up to 119    Treatment: IV Zosyn, IVF, EKG, blood cultures, Transfer for IR Procedure    Thank you,  Rebeca ANGELA, RN, CCDS  Please contact me for any questions or concerns regarding this query at   [email or web address]  Options provided:  -- Sepsis due to  Diverticular abscess present on admission  -- Diverticular abscess without Sepsis  -- Other - I will add my own diagnosis  -- Disagree - Not applicable / Not valid  -- Disagree - Clinically unable to determine / Unknown  -- Refer to Clinical Documentation Reviewer    PROVIDER RESPONSE TEXT:    This patient has sepsis due to Diverticular abscess which was present on   admission.    Query created by: Rebeca Macedo on 2024 10:36 AM      Electronically signed by:  Karli PIERRE 2024 11:05 AM

## 2024-01-20 NOTE — PROGRESS NOTES
Dr. Brooks asked for transfer center to be contacted to check bed status. If no bed available at [facility] this morning, wants search of other facilities done. Called transfer center and spoke to Sandra. [facility] does not have a bed available this morning. Requested bed search be expanded to include facilities in a 50 mile radius that have IR and gastroenterology services. Sandra states she will call to update if a bed is located.

## 2024-01-20 NOTE — DISCHARGE SUMMARY
Hospitalist Discharge Summary     Patient ID:    Robb Enamorado  [id number]  63 y.o.  1960    Admit date: 1/17/2024    Discharge date : 1/20/2024    Chronic Diagnoses:  22    Final Diagnoses:   Principal Problem:    Intestinal diverticular abscess  Resolved Problems:    * No resolved hospital problems. *      Reason for Hospitalization:     Robb Enamorado is a 63 y.o.   male with a past medical history of GERD and HTN who presents to the ED with a chief complaint of lower abdominal pain. Patient reports that the abdominal pain started 5 to 6 days ago, other accompanying symptoms includes sleepless nights related to SOB where he had to sleep in a recliner chair, gets choked up after drinking water or eating, feels nausea and had some vomiting. Denies fever, chills, chest pain, palpitations, headaches, light headiness, fatigue or weakness and no diarrhea. He had a TKR on Dec 18 in [place], stopped taking pain medications and continued with laxative because felt relieved from abdomen pain after having a BM. In the ED patient was found to have a D-Dimer 3.20, UA shown trace leukocyte esterase, CT of abdomen/ chest: No acute pulmonary artery embolism or other acute abnormality in the chest, two areas of acute diverticulitis at the junction of the descending and sigmoid colon and in the sigmoid colon where there is an intramural abscess measuring 4.0 cm. Blood cultures pending. WBC: 13.3, RBC 3.80. CXR: No acute process. Recd 1000 ml of NS, Zosyn, morphine and nausea medication. Discussed case with ED provider, hospital medicine will admit the patient for further evaluation and treatment. While in the ED general surgery Dr. Traore was consulted due to the intestinal abscesses, and he recommended to transfer the patient to an higher level of care to have IR drain abscesses. Patient assessed at the bedside, patient is alert and oriented, there is no acute distress noted.  Neutrophils Absolute 4.8 1.8 - 8.0 K/UL    Lymphocytes Absolute 1.3 0.9 - 3.6 K/UL    Monocytes Absolute 0.6 0.05 - 1.2 K/UL    Eosinophils Absolute 0.3 0.0 - 0.4 K/UL    Basophils Absolute 0.1 0.0 - 0.1 K/UL    Absolute Immature Granulocyte 0.0 0.00 - 0.04 K/UL    Differential Type AUTOMATED     Magnesium    Collection Time: 01/20/24  5:30 AM   Result Value Ref Range    Magnesium 2.1 1.6 - 2.6 mg/dL   Phosphorus    Collection Time: 01/20/24  5:30 AM   Result Value Ref Range    Phosphorus 3.2 2.5 - 4.9 mg/dL     CT ABDOMEN PELVIS W IV CONTRAST Additional Contrast? None   Final Result   1. No acute pulmonary artery embolism or other acute abnormality in the chest.      2. Two areas of acute diverticulitis at the junction of the descending and   sigmoid colon and in the sigmoid colon where there is an intramural abscess   measuring 4.0 cm.         CTA CHEST W WO CONTRAST   Final Result   1. No acute pulmonary artery embolism or other acute abnormality in the chest.      2. Two areas of acute diverticulitis at the junction of the descending and   sigmoid colon and in the sigmoid colon where there is an intramural abscess   measuring 4.0 cm.         XR CHEST PORTABLE   Final Result      No acute process.           Signed:  TEJINDER Crouch - CNP  1/20/2024  12:49 PM

## 2024-01-21 LAB
BACTERIA SPEC CULT: NORMAL
BACTERIA SPEC CULT: NORMAL
Lab: NORMAL
Lab: NORMAL

## 2024-01-23 LAB
BACTERIA SPEC CULT: NORMAL
BACTERIA SPEC CULT: NORMAL
Lab: NORMAL
Lab: NORMAL

## 2025-04-22 ENCOUNTER — TELEPHONE (OUTPATIENT)
Age: 65
End: 2025-04-22

## 2025-04-22 NOTE — TELEPHONE ENCOUNTER
Referral for a screening colonoscopy. Please review and advise.    Referral  Referral # [id number]  Referral Information    Referral # Creation Date Referral Status Status Update    [id number] 04/22/2025 Pending Review 04/22/2025: Status History     Status Reason Referral Type Referral Reasons Referral Class   Pending Physician Review Eval and Treat Specialty Services Required Incoming     To Specialty To Provider To Location/Place of Service To Department   Gastroenterology Juliano Izaguirre MD none [facility] - GASTROENTEROLOGY     To Vendor Referred By By Location/Place of Service By Department   none Urvashi Muñoz APRN - NP none none     Priority Start Date Expiration Date Referral Entered By   Routine 12/31/2024 12/31/2025 Kelly Umaña MA     Visits Requested Visits Authorized Visits Completed Visits Scheduled   2 2       Coverages    Payer Plan Auth. Required? Covered? Member # Authorized From Expires Auth # Precert. # Comment   VAC[employer] OPTUM V[employer] -- Covered [id number] 11/5/2011 -- [id number] -- --   BCBS BCBS OUT OF STATE -- Covered [id number] 1/1/2024 -- -- -- --     Referral Information    Referral # Creation Date Referral Status Status Update    [id number] 04/22/2025 Pending Review 04/22/2025: Status History     Status Reason Referral Type Referral Reasons Referral Class   Pending Physician Review Eval and Treat Specialty Services Required Incoming     To Specialty To Provider To Location/Place of Service To Department   Gastroenterology Juliano Izaguirre MD none [facility] - GASTROENTEROLOGY     To Vendor Referred By By Location/Place of Service By Department   none Urvashi Muñoz APRN - NP none none     Priority Start Date Expiration Date Referral Entered By   Routine 12/31/2024 12/31/2025 Kelly Umaña MA     Visits Requested Visits Authorized Visits Completed Visits Scheduled   2 2       Procedure Information    Service

## 2025-04-25 ENCOUNTER — SCHEDULED TELEPHONE ENCOUNTER (OUTPATIENT)
Age: 65
End: 2025-04-25

## 2025-04-25 DIAGNOSIS — Z12.11 ENCOUNTER FOR SCREENING COLONOSCOPY: ICD-10-CM

## 2025-04-25 NOTE — PROGRESS NOTES
Patient scheduled for a colonoscopy on 6/17/2025  Surgical Registration Form scanned.  Case Request Opened

## 2025-05-25 PROBLEM — Z12.11 ENCOUNTER FOR SCREENING COLONOSCOPY: Status: RESOLVED | Noted: 2025-04-25 | Resolved: 2025-05-25

## 2025-06-03 ENCOUNTER — ANESTHESIA EVENT (OUTPATIENT)
Age: 65
End: 2025-06-03
Payer: OTHER GOVERNMENT

## 2025-06-03 RX ORDER — SODIUM CHLORIDE 0.9 % (FLUSH) 0.9 %
5-40 SYRINGE (ML) INJECTION PRN
Status: CANCELLED | OUTPATIENT
Start: 2025-06-03

## 2025-06-03 RX ORDER — SODIUM CHLORIDE 9 MG/ML
INJECTION, SOLUTION INTRAVENOUS PRN
Status: CANCELLED | OUTPATIENT
Start: 2025-06-03

## 2025-06-03 RX ORDER — SODIUM CHLORIDE, SODIUM LACTATE, POTASSIUM CHLORIDE, CALCIUM CHLORIDE 600; 310; 30; 20 MG/100ML; MG/100ML; MG/100ML; MG/100ML
INJECTION, SOLUTION INTRAVENOUS CONTINUOUS
Status: CANCELLED | OUTPATIENT
Start: 2025-06-03

## 2025-06-03 RX ORDER — SODIUM CHLORIDE 0.9 % (FLUSH) 0.9 %
5-40 SYRINGE (ML) INJECTION EVERY 12 HOURS SCHEDULED
Status: CANCELLED | OUTPATIENT
Start: 2025-06-03

## 2025-06-12 PROBLEM — Z12.11 ENCOUNTER FOR SCREENING COLONOSCOPY: Status: ACTIVE | Noted: 2025-04-25

## 2025-06-15 ENCOUNTER — PREP FOR PROCEDURE (OUTPATIENT)
Age: 65
End: 2025-06-15

## 2025-06-15 DIAGNOSIS — Z12.11 ENCOUNTER FOR SCREENING COLONOSCOPY: Primary | ICD-10-CM

## 2025-06-15 RX ORDER — SODIUM CHLORIDE, SODIUM LACTATE, POTASSIUM CHLORIDE, CALCIUM CHLORIDE 600; 310; 30; 20 MG/100ML; MG/100ML; MG/100ML; MG/100ML
INJECTION, SOLUTION INTRAVENOUS CONTINUOUS
Status: CANCELLED | OUTPATIENT
Start: 2025-06-15

## 2025-06-15 NOTE — H&P
Open access updated H&P.      Brief history: The patient is male Black /  65 y.o. who was referred for screening colonoscopy.  Review of the records showed that they had few if any health problems, excessive obesity, or significant medications that would require office evaluation prior to colonoscopy for colon cancer screening.  After review of their chart, contact was made with the patient in discussion and literature sent regarding the procedure and the bowel preparation.  They are here now for their procedure.      Past medical and surgical history:   Past Medical History:   Diagnosis Date    FHx: colon cancer     H/O: HTN (hypertension)     Hypertension     Tobacco use       Past Surgical History:   Procedure Laterality Date    HERNIA REPAIR N/A     ORTHOPEDIC SURGERY      otho knee left arthroscopy    SD UNLISTED PROCEDURE ABDOMEN PERITONEUM & OMENTUM          Allergies:    Allergies   Allergen Reactions    Oxycodone Shortness Of Breath and Nausea And Vomiting        Medications:  No current facility-administered medications for this encounter.    Current Outpatient Medications:     acetaminophen (TYLENOL) 500 MG tablet, Take 1 tablet by mouth every 8 hours as needed for Pain, Disp: , Rfl:     meloxicam (MOBIC) 15 MG tablet, Take 1 tablet by mouth daily as needed for Pain, Disp: , Rfl:     clindamycin (CLEOCIN T) 1 % external solution, Apply 1 Application topically 2 times daily Apply topically to affected area 2 times daily as needed, Disp: , Rfl:     omeprazole (PRILOSEC) 20 MG delayed release capsule, Take 1 capsule by mouth daily, Disp: , Rfl:     losartan (COZAAR) 50 MG tablet, Take 1 tablet by mouth daily, Disp: , Rfl:     amLODIPine (NORVASC) 10 MG tablet, Take 1 tablet by mouth daily, Disp: , Rfl:     aspirin 325 MG EC tablet, Take 1 tablet by mouth daily, Disp: , Rfl:     spironolactone (ALDACTONE) 25 MG tablet, Take 0.5 tablets by mouth daily, Disp: , Rfl:     atorvastatin (LIPITOR) 40

## 2025-06-17 ENCOUNTER — ANESTHESIA (OUTPATIENT)
Age: 65
End: 2025-06-17
Payer: OTHER GOVERNMENT

## 2025-06-17 ENCOUNTER — HOSPITAL ENCOUNTER (OUTPATIENT)
Age: 65
Setting detail: OUTPATIENT SURGERY
Discharge: HOME | End: 2025-06-17
Attending: INTERNAL MEDICINE | Admitting: INTERNAL MEDICINE
Payer: OTHER GOVERNMENT

## 2025-06-17 VITALS
BODY MASS INDEX: 29.82 KG/M2 | SYSTOLIC BLOOD PRESSURE: 157 MMHG | RESPIRATION RATE: 18 BRPM | HEIGHT: 67 IN | DIASTOLIC BLOOD PRESSURE: 99 MMHG | WEIGHT: 190 LBS | HEART RATE: 85 BPM | OXYGEN SATURATION: 100 % | TEMPERATURE: 97 F

## 2025-06-17 DIAGNOSIS — Z12.11 ENCOUNTER FOR SCREENING COLONOSCOPY: ICD-10-CM

## 2025-06-17 PROBLEM — Z80.0 FAMILY HISTORY OF COLON CANCER: Status: ACTIVE | Noted: 2025-06-17

## 2025-06-17 PROCEDURE — 7100000011 HC PHASE II RECOVERY - ADDTL 15 MIN: Performed by: INTERNAL MEDICINE

## 2025-06-17 PROCEDURE — 7100000010 HC PHASE II RECOVERY - FIRST 15 MIN: Performed by: INTERNAL MEDICINE

## 2025-06-17 PROCEDURE — 3700000001 HC ADD 15 MINUTES (ANESTHESIA): Performed by: INTERNAL MEDICINE

## 2025-06-17 PROCEDURE — 3700000000 HC ANESTHESIA ATTENDED CARE: Performed by: INTERNAL MEDICINE

## 2025-06-17 PROCEDURE — 3600007501: Performed by: INTERNAL MEDICINE

## 2025-06-17 PROCEDURE — 3600007511: Performed by: INTERNAL MEDICINE

## 2025-06-17 PROCEDURE — 45378 DIAGNOSTIC COLONOSCOPY: CPT | Performed by: INTERNAL MEDICINE

## 2025-06-17 PROCEDURE — 6360000002 HC RX W HCPCS: Performed by: NURSE ANESTHETIST, CERTIFIED REGISTERED

## 2025-06-17 PROCEDURE — 2580000003 HC RX 258: Performed by: INTERNAL MEDICINE

## 2025-06-17 PROCEDURE — 2709999900 HC NON-CHARGEABLE SUPPLY: Performed by: INTERNAL MEDICINE

## 2025-06-17 RX ORDER — SODIUM CHLORIDE 0.9 % (FLUSH) 0.9 %
5-40 SYRINGE (ML) INJECTION PRN
Status: CANCELLED | OUTPATIENT
Start: 2025-06-17

## 2025-06-17 RX ORDER — SODIUM CHLORIDE, SODIUM LACTATE, POTASSIUM CHLORIDE, CALCIUM CHLORIDE 600; 310; 30; 20 MG/100ML; MG/100ML; MG/100ML; MG/100ML
INJECTION, SOLUTION INTRAVENOUS CONTINUOUS
Status: DISCONTINUED | OUTPATIENT
Start: 2025-06-17 | End: 2025-06-17 | Stop reason: HOSPADM

## 2025-06-17 RX ORDER — LIDOCAINE HYDROCHLORIDE 20 MG/ML
INJECTION, SOLUTION INFILTRATION; PERINEURAL
Status: DISCONTINUED | OUTPATIENT
Start: 2025-06-17 | End: 2025-06-17 | Stop reason: SDUPTHER

## 2025-06-17 RX ORDER — SODIUM CHLORIDE 9 MG/ML
INJECTION, SOLUTION INTRAVENOUS PRN
Status: CANCELLED | OUTPATIENT
Start: 2025-06-17

## 2025-06-17 RX ORDER — DIPHENHYDRAMINE HYDROCHLORIDE 50 MG/ML
12.5 INJECTION, SOLUTION INTRAMUSCULAR; INTRAVENOUS
Status: CANCELLED | OUTPATIENT
Start: 2025-06-17

## 2025-06-17 RX ORDER — ONDANSETRON 2 MG/ML
4 INJECTION INTRAMUSCULAR; INTRAVENOUS
Status: CANCELLED | OUTPATIENT
Start: 2025-06-17

## 2025-06-17 RX ORDER — SODIUM CHLORIDE 0.9 % (FLUSH) 0.9 %
5-40 SYRINGE (ML) INJECTION EVERY 12 HOURS SCHEDULED
Status: CANCELLED | OUTPATIENT
Start: 2025-06-17

## 2025-06-17 RX ORDER — NALOXONE HYDROCHLORIDE 0.4 MG/ML
INJECTION, SOLUTION INTRAMUSCULAR; INTRAVENOUS; SUBCUTANEOUS PRN
Status: CANCELLED | OUTPATIENT
Start: 2025-06-17

## 2025-06-17 RX ORDER — FENTANYL CITRATE 50 UG/ML
25 INJECTION, SOLUTION INTRAMUSCULAR; INTRAVENOUS EVERY 5 MIN PRN
Refills: 0 | Status: CANCELLED | OUTPATIENT
Start: 2025-06-17

## 2025-06-17 RX ORDER — PROPOFOL 10 MG/ML
INJECTION, EMULSION INTRAVENOUS
Status: DISCONTINUED | OUTPATIENT
Start: 2025-06-17 | End: 2025-06-17 | Stop reason: SDUPTHER

## 2025-06-17 RX ADMIN — SODIUM CHLORIDE, SODIUM LACTATE, POTASSIUM CHLORIDE, AND CALCIUM CHLORIDE: .6; .31; .03; .02 INJECTION, SOLUTION INTRAVENOUS at 10:16

## 2025-06-17 RX ADMIN — LIDOCAINE HYDROCHLORIDE 20 MG: 20 INJECTION, SOLUTION INFILTRATION; PERINEURAL at 10:24

## 2025-06-17 RX ADMIN — PROPOFOL 100 MG: 10 INJECTION, EMULSION INTRAVENOUS at 10:30

## 2025-06-17 RX ADMIN — PROPOFOL 180 MCG/KG/MIN: 10 INJECTION, EMULSION INTRAVENOUS at 10:24

## 2025-06-17 ASSESSMENT — PAIN - FUNCTIONAL ASSESSMENT
PAIN_FUNCTIONAL_ASSESSMENT: ADULT NONVERBAL PAIN SCALE (NPVS)
PAIN_FUNCTIONAL_ASSESSMENT: NONE - DENIES PAIN

## 2025-06-17 NOTE — ANESTHESIA PRE PROCEDURE
Department of Anesthesiology  Preprocedure Note       Name:  Robb Enamorado   Age:  65 y.o.  :  1960                                          MRN:  [id number]         Date:  2025      Surgeon: Surgeon(s):  Juliano Izaguirre MD    Procedure: Procedure(s):  Colonoscopy    Medications prior to admission:   Prior to Admission medications    Medication Sig Start Date End Date Taking? Authorizing Provider   acetaminophen (TYLENOL) 500 MG tablet Take 1 tablet by mouth every 8 hours as needed for Pain   Yes Jr Joyce MD   meloxicam (MOBIC) 15 MG tablet Take 1 tablet by mouth daily as needed for Pain   Yes ProviderJr MD   clindamycin (CLEOCIN T) 1 % external solution Apply 1 Application topically 2 times daily Apply topically to affected area 2 times daily as needed   Yes ProviderJr MD   omeprazole (PRILOSEC) 20 MG delayed release capsule Take 1 capsule by mouth daily   Yes Jr Joyce MD   losartan (COZAAR) 50 MG tablet Take 1 tablet by mouth daily   Yes Jr Joyce MD   aspirin 325 MG EC tablet Take 1 tablet by mouth daily   Yes ProviderJr MD   amLODIPine (NORVASC) 10 MG tablet Take 1 tablet by mouth daily  Patient not taking: Reported on 2025    ProviderJr MD   spironolactone (ALDACTONE) 25 MG tablet Take 0.5 tablets by mouth daily    ProviderJr MD   atorvastatin (LIPITOR) 40 MG tablet Take 1 tablet by mouth nightly    Automatic Reconciliation, Ar       Current medications:    Current Facility-Administered Medications   Medication Dose Route Frequency Provider Last Rate Last Admin   • lactated ringers infusion   IntraVENous Continuous Juliano Izaguirre  mL/hr at 25 1016 New Bag at 25 1016       Allergies:    Allergies   Allergen Reactions   • Oxycodone Shortness Of Breath and Nausea And Vomiting       Problem List:    Patient Active Problem List   Diagnosis Code   • Mixed hyperlipidemia E78.2

## 2025-06-17 NOTE — ANESTHESIA POSTPROCEDURE EVALUATION
Department of Anesthesiology  Postprocedure Note    Patient: Robb Enamorado  MRN: [id number]  YOB: 1960  Date of evaluation: 6/17/2025    Procedure Summary       Date: 06/17/25 Room / Location: [facility] ENDO 01 / [facility] ENDOSCOPY    Anesthesia Start: 1019 Anesthesia Stop: 1035    Procedure: Colonoscopy (Lower GI Region) Diagnosis:       Encounter for screening colonoscopy      (Encounter for screening colonoscopy [Z12.11])    Surgeons: Juliano Izaguirre MD Responsible Provider: Gen Herrera APRN - CRNA    Anesthesia Type: MAC ASA Status: 2            Anesthesia Type: MAC    Kim Phase I: Kim Score: 10    Kim Phase II:      Anesthesia Post Evaluation    Patient location during evaluation: bedside  Patient participation: complete - patient participated  Level of consciousness: awake  Airway patency: patent  Nausea & Vomiting: no vomiting and no nausea  Cardiovascular status: blood pressure returned to baseline  Respiratory status: acceptable  Hydration status: stable  Pain management: adequate    No notable events documented.  
2019 16:23

## 2025-06-17 NOTE — INTERVAL H&P NOTE
Update History & Physical    The patient's History and Physical of June 17, 2025 was reviewed with the patient and I examined the patient. There was no change. The surgical site was confirmed by the patient and me.     Plan: The risks, benefits, expected outcome, and alternative to the recommended procedure have been discussed with the patient. Patient understands and wants to proceed with the procedure.     Electronically signed by Juliano Izaguirre MD on 6/17/2025 at 10:05 AM

## 2025-06-17 NOTE — OP NOTE
Colonoscopy procedure note    Date of service: 6/17/2025    Type:  Screening    Indication for procedure: Family history of colon cancer    Anesthesia classification: ASA class 2    Patient history and physical been accomplished and documented.  Patient is assessed and determined to be appropriate candidate for planned procedure and sedation; patient reassessed immediately prior to sedation.      Sedation plan: MAC per anesthesia    Surgical assistant: Not applicable    Airway assessment: Range of motion: Normal, mouth opening, Visual obstruction: No.    UPDATED PREOP EXAM:  Unchanged.    VS: Reviewed  Gen: in NAD  CV: RRR, no murmur  Resp: CTA  Abd: Soft, NTND, +BS  Extrem: No cyanosis or edema  Neuro: Awake and alert    Informed consent obtained: Yes.  The indications, risks including but not limited to bleeding, perforation, infection, death, and potential failure to visual areas are diagnosed neoplasia, alternatives and benefits were discussed with the patient prior to the procedure.  Patient identity and procedure was verified, absent was obtained, and is consistent with the consent form found in the patient's records.    PROCEDURE PERFORMED:  COLONOSCOPY  to the cecum with MAC     INSTRUMENT: Olympus colonoscope per nursing notes.    FINDINGS:    External anal lesions: Normal   Rectum: normal.  Rectal sphincter tone was normal.   Retroflexion view: Grade 1 internal hemorrhoids.  Sigmoid: normal except for diverticulosis.  Descending Colon: normal except for diverticulosis.  Transverse Colon: normal   Ascending Colon: normal   Cecum: normal, including the appendiceal orifice and ileocecal valve.    Terminal ileum: not evaluated     Specimens: none     Bowel preparation- adequate to detect small (5mm) polyps or larger.    Estimated blood loss: none   Complications:  none   Cecal withdrawal time: 6 minutes.    Comments:  none.   While colonoscopy is the best test to prevent and detect cancer, it is not perfect.

## 2025-07-12 PROBLEM — Z12.11 ENCOUNTER FOR SCREENING COLONOSCOPY: Status: RESOLVED | Noted: 2025-04-25 | Resolved: 2025-07-12

## (undated) DEVICE — KIT COLON W/ 1.1OZ LUB AND 2 END

## (undated) DEVICE — TUBING INSUFFLATION CAP W/ EXT CARBON DIOX ENDO SMARTCAP

## (undated) DEVICE — SOLUTION IRRIG 500ML STRL H2O NONPYROGENIC

## (undated) DEVICE — TUBING, SUCTION, 9/32" X 10', STRAIGHT: Brand: MEDLINE

## (undated) DEVICE — SOLUTION IRRIG 1000ML STRL H2O USP PLAS POUR BTL

## (undated) DEVICE — Device: Brand: DEFENDO VALVE AND CONNECTOR KIT

## (undated) DEVICE — TUBING IRRIGATION BK FLO VLV FOR OFP ENDOSTAT ENDOGATOR DISP